# Patient Record
Sex: FEMALE | Race: BLACK OR AFRICAN AMERICAN | NOT HISPANIC OR LATINO | URBAN - METROPOLITAN AREA
[De-identification: names, ages, dates, MRNs, and addresses within clinical notes are randomized per-mention and may not be internally consistent; named-entity substitution may affect disease eponyms.]

---

## 2017-06-08 ENCOUNTER — OUTPATIENT (OUTPATIENT)
Dept: OUTPATIENT SERVICES | Facility: HOSPITAL | Age: 32
LOS: 1 days | End: 2017-06-08

## 2017-06-08 VITALS
TEMPERATURE: 98 F | WEIGHT: 216.05 LBS | RESPIRATION RATE: 16 BRPM | SYSTOLIC BLOOD PRESSURE: 132 MMHG | HEIGHT: 63 IN | HEART RATE: 74 BPM | DIASTOLIC BLOOD PRESSURE: 84 MMHG

## 2017-06-08 DIAGNOSIS — N84.0 POLYP OF CORPUS UTERI: ICD-10-CM

## 2017-06-08 DIAGNOSIS — Z98.890 OTHER SPECIFIED POSTPROCEDURAL STATES: Chronic | ICD-10-CM

## 2017-06-08 DIAGNOSIS — D64.9 ANEMIA, UNSPECIFIED: ICD-10-CM

## 2017-06-08 LAB
HCT VFR BLD CALC: 36.2 % — SIGNIFICANT CHANGE UP (ref 34.5–45)
HGB BLD-MCNC: 11.3 G/DL — LOW (ref 11.5–15.5)
MCHC RBC-ENTMCNC: 25.5 PG — LOW (ref 27–34)
MCHC RBC-ENTMCNC: 31.2 % — LOW (ref 32–36)
MCV RBC AUTO: 81.7 FL — SIGNIFICANT CHANGE UP (ref 80–100)
PLATELET # BLD AUTO: 316 K/UL — SIGNIFICANT CHANGE UP (ref 150–400)
PMV BLD: 11 FL — SIGNIFICANT CHANGE UP (ref 7–13)
RBC # BLD: 4.43 M/UL — SIGNIFICANT CHANGE UP (ref 3.8–5.2)
RBC # FLD: 14.7 % — HIGH (ref 10.3–14.5)
WBC # BLD: 6.33 K/UL — SIGNIFICANT CHANGE UP (ref 3.8–10.5)
WBC # FLD AUTO: 6.33 K/UL — SIGNIFICANT CHANGE UP (ref 3.8–10.5)

## 2017-06-08 NOTE — H&P PST ADULT - NECK DETAILS
supple/no JVD Palpable swelling to neck, pt report she had same evaluated in 2016 and NAD/no thyroid abnormality noted/supple/no JVD

## 2017-06-08 NOTE — H&P PST ADULT - HISTORY OF PRESENT ILLNESS
32 yo obese female with medical h/o Uterine fibroids, reports her desire to get pregnant and removal of fibroids was recommended. Pt presents today for presurgical evaluation for Hysteroscopy, Removal of Leiomyomata scheduled for 6/14/2017.

## 2017-06-08 NOTE — H&P PST ADULT - LYMPHATIC
anterior cervical R/posterior cervical L/supraclavicular L/supraclavicular R/posterior cervical R/anterior cervical L

## 2017-06-08 NOTE — H&P PST ADULT - MUSCULOSKELETAL
detailed exam negative no calf tenderness/ROM intact/no joint swelling/normal strength/no joint erythema/no joint warmth

## 2017-06-08 NOTE — H&P PST ADULT - NEGATIVE GENERAL GENITOURINARY SYMPTOMS
no renal colic/no incontinence/no flank pain L/no urinary hesitancy/no flank pain R/no urine discoloration/no hematuria/no dysuria/no nocturia/no bladder infections/normal urinary frequency

## 2017-06-08 NOTE — H&P PST ADULT - CONSTITUTIONAL DETAILS
no distress/obese/BMI 38.3/well-developed obese/well-nourished/well-developed/BMI 38.3/no distress/well-groomed

## 2017-06-08 NOTE — H&P PST ADULT - FAMILY HISTORY
Father  Still living? Unknown  Family history of hypertension, Age at diagnosis: Age Unknown  Family history of borderline diabetes mellitus, Age at diagnosis: Age Unknown     Mother  Still living? Unknown  Family history of hypertension, Age at diagnosis: Age Unknown

## 2017-06-08 NOTE — H&P PST ADULT - PROBLEM SELECTOR PLAN 1
Hysteroscopy, Removal of Leiomyomata scheduled for 6/14/2017.  Pre-op instructions given. Pt verbalized understanding.  Pepcid given for GI prophylaxis.  UCG ordered STAT for day of procedure - urine container given.

## 2017-06-14 ENCOUNTER — RESULT REVIEW (OUTPATIENT)
Age: 32
End: 2017-06-14

## 2017-06-14 ENCOUNTER — OUTPATIENT (OUTPATIENT)
Dept: OUTPATIENT SERVICES | Facility: HOSPITAL | Age: 32
LOS: 1 days | Discharge: HOME | End: 2017-06-14
Payer: COMMERCIAL

## 2017-06-14 VITALS
RESPIRATION RATE: 16 BRPM | OXYGEN SATURATION: 98 % | HEART RATE: 81 BPM | DIASTOLIC BLOOD PRESSURE: 84 MMHG | HEIGHT: 63 IN | SYSTOLIC BLOOD PRESSURE: 137 MMHG | WEIGHT: 216.05 LBS | TEMPERATURE: 98 F

## 2017-06-14 VITALS
OXYGEN SATURATION: 98 % | DIASTOLIC BLOOD PRESSURE: 83 MMHG | TEMPERATURE: 98 F | HEART RATE: 88 BPM | RESPIRATION RATE: 14 BRPM | SYSTOLIC BLOOD PRESSURE: 128 MMHG

## 2017-06-14 DIAGNOSIS — N84.0 POLYP OF CORPUS UTERI: ICD-10-CM

## 2017-06-14 DIAGNOSIS — Z98.890 OTHER SPECIFIED POSTPROCEDURAL STATES: Chronic | ICD-10-CM

## 2017-06-14 PROCEDURE — 88305 TISSUE EXAM BY PATHOLOGIST: CPT | Mod: 26

## 2017-06-14 RX ORDER — SODIUM CHLORIDE 9 MG/ML
3 INJECTION INTRAMUSCULAR; INTRAVENOUS; SUBCUTANEOUS ONCE
Qty: 0 | Refills: 0 | Status: DISCONTINUED | OUTPATIENT
Start: 2017-06-14 | End: 2017-06-15

## 2017-06-14 RX ORDER — SODIUM CHLORIDE 9 MG/ML
1000 INJECTION, SOLUTION INTRAVENOUS
Qty: 0 | Refills: 0 | Status: DISCONTINUED | OUTPATIENT
Start: 2017-06-14 | End: 2017-06-15

## 2017-06-14 NOTE — ASU DISCHARGE PLAN (ADULT/PEDIATRIC). - ACTIVITY LEVEL
no tub baths/no intercourse/no douching/no tampons/nothing per vagina no intercourse/nothing per vagina/no tampons/no tub baths/no douching/no sports/gym/no heavy lifting

## 2017-06-14 NOTE — ASU DISCHARGE PLAN (ADULT/PEDIATRIC). - MEDICATION SUMMARY - MEDICATIONS TO TAKE
I will START or STAY ON the medications listed below when I get home from the hospital:    Womens one a day prenatal +DHA  -- 1 cap(s) by mouth once a day  -- Indication: For supplement    ferrous sulfate 325 mg (65 mg elemental iron) oral delayed release tablet  -- 1 tab(s) by mouth once a day  -- Indication: For supplement

## 2017-06-14 NOTE — ASU DISCHARGE PLAN (ADULT/PEDIATRIC). - NURSING INSTRUCTIONS
You were given IV Tylenol for pain management.  Please DO NOT take tylenol or products that contain tylenol for the next 6-8 hours (until 10:00PM ). Please do not exceed 3000mg in 24hours.

## 2017-06-15 ENCOUNTER — TRANSCRIPTION ENCOUNTER (OUTPATIENT)
Age: 32
End: 2017-06-15

## 2019-02-10 ENCOUNTER — TRANSCRIPTION ENCOUNTER (OUTPATIENT)
Age: 34
End: 2019-02-10

## 2019-02-10 ENCOUNTER — EMERGENCY (EMERGENCY)
Facility: HOSPITAL | Age: 34
LOS: 1 days | Discharge: HOME | End: 2019-02-10
Attending: EMERGENCY MEDICINE | Admitting: EMERGENCY MEDICINE
Payer: COMMERCIAL

## 2019-02-10 VITALS
DIASTOLIC BLOOD PRESSURE: 71 MMHG | RESPIRATION RATE: 18 BRPM | SYSTOLIC BLOOD PRESSURE: 135 MMHG | OXYGEN SATURATION: 99 % | HEART RATE: 98 BPM | TEMPERATURE: 99 F

## 2019-02-10 DIAGNOSIS — Z98.890 OTHER SPECIFIED POSTPROCEDURAL STATES: Chronic | ICD-10-CM

## 2019-02-10 LAB
ANION GAP SERPL CALC-SCNC: 14 MMO/L — SIGNIFICANT CHANGE UP (ref 7–14)
APPEARANCE UR: SIGNIFICANT CHANGE UP
BACTERIA # UR AUTO: NEGATIVE — SIGNIFICANT CHANGE UP
BASOPHILS # BLD AUTO: 0.04 K/UL — SIGNIFICANT CHANGE UP (ref 0–0.2)
BASOPHILS NFR BLD AUTO: 0.5 % — SIGNIFICANT CHANGE UP (ref 0–2)
BILIRUB UR-MCNC: NEGATIVE — SIGNIFICANT CHANGE UP
BLD GP AB SCN SERPL QL: NEGATIVE — SIGNIFICANT CHANGE UP
BLOOD UR QL VISUAL: NEGATIVE — SIGNIFICANT CHANGE UP
BUN SERPL-MCNC: 5 MG/DL — LOW (ref 7–23)
CALCIUM SERPL-MCNC: 9.5 MG/DL — SIGNIFICANT CHANGE UP (ref 8.4–10.5)
CHLORIDE SERPL-SCNC: 102 MMOL/L — SIGNIFICANT CHANGE UP (ref 98–107)
CO2 SERPL-SCNC: 21 MMOL/L — LOW (ref 22–31)
COLOR SPEC: YELLOW — SIGNIFICANT CHANGE UP
CREAT SERPL-MCNC: 0.61 MG/DL — SIGNIFICANT CHANGE UP (ref 0.5–1.3)
EOSINOPHIL # BLD AUTO: 0.11 K/UL — SIGNIFICANT CHANGE UP (ref 0–0.5)
EOSINOPHIL NFR BLD AUTO: 1.4 % — SIGNIFICANT CHANGE UP (ref 0–6)
GLUCOSE SERPL-MCNC: 97 MG/DL — SIGNIFICANT CHANGE UP (ref 70–99)
GLUCOSE UR-MCNC: NEGATIVE — SIGNIFICANT CHANGE UP
HCG SERPL-ACNC: SIGNIFICANT CHANGE UP MIU/ML
HCT VFR BLD CALC: 37.8 % — SIGNIFICANT CHANGE UP (ref 34.5–45)
HGB BLD-MCNC: 12 G/DL — SIGNIFICANT CHANGE UP (ref 11.5–15.5)
HYALINE CASTS # UR AUTO: NEGATIVE — SIGNIFICANT CHANGE UP
IMM GRANULOCYTES NFR BLD AUTO: 0.7 % — SIGNIFICANT CHANGE UP (ref 0–1.5)
KETONES UR-MCNC: NEGATIVE — SIGNIFICANT CHANGE UP
LEUKOCYTE ESTERASE UR-ACNC: NEGATIVE — SIGNIFICANT CHANGE UP
LYMPHOCYTES # BLD AUTO: 0.73 K/UL — LOW (ref 1–3.3)
LYMPHOCYTES # BLD AUTO: 9 % — LOW (ref 13–44)
MCHC RBC-ENTMCNC: 25.9 PG — LOW (ref 27–34)
MCHC RBC-ENTMCNC: 31.7 % — LOW (ref 32–36)
MCV RBC AUTO: 81.5 FL — SIGNIFICANT CHANGE UP (ref 80–100)
MONOCYTES # BLD AUTO: 0.55 K/UL — SIGNIFICANT CHANGE UP (ref 0–0.9)
MONOCYTES NFR BLD AUTO: 6.8 % — SIGNIFICANT CHANGE UP (ref 2–14)
NEUTROPHILS # BLD AUTO: 6.61 K/UL — SIGNIFICANT CHANGE UP (ref 1.8–7.4)
NEUTROPHILS NFR BLD AUTO: 81.6 % — HIGH (ref 43–77)
NITRITE UR-MCNC: NEGATIVE — SIGNIFICANT CHANGE UP
NRBC # FLD: 0 K/UL — LOW (ref 25–125)
PH UR: 8 — SIGNIFICANT CHANGE UP (ref 5–8)
PLATELET # BLD AUTO: 281 K/UL — SIGNIFICANT CHANGE UP (ref 150–400)
PMV BLD: 10.1 FL — SIGNIFICANT CHANGE UP (ref 7–13)
POTASSIUM SERPL-MCNC: 4.3 MMOL/L — SIGNIFICANT CHANGE UP (ref 3.5–5.3)
POTASSIUM SERPL-SCNC: 4.3 MMOL/L — SIGNIFICANT CHANGE UP (ref 3.5–5.3)
PROT UR-MCNC: 30 — SIGNIFICANT CHANGE UP
RBC # BLD: 4.64 M/UL — SIGNIFICANT CHANGE UP (ref 3.8–5.2)
RBC # FLD: 14 % — SIGNIFICANT CHANGE UP (ref 10.3–14.5)
RBC CASTS # UR COMP ASSIST: SIGNIFICANT CHANGE UP (ref 0–?)
RH IG SCN BLD-IMP: POSITIVE — SIGNIFICANT CHANGE UP
SODIUM SERPL-SCNC: 137 MMOL/L — SIGNIFICANT CHANGE UP (ref 135–145)
SP GR SPEC: 1.02 — SIGNIFICANT CHANGE UP (ref 1–1.04)
SQUAMOUS # UR AUTO: SIGNIFICANT CHANGE UP
UROBILINOGEN FLD QL: NORMAL — SIGNIFICANT CHANGE UP
WBC # BLD: 8.1 K/UL — SIGNIFICANT CHANGE UP (ref 3.8–10.5)
WBC # FLD AUTO: 8.1 K/UL — SIGNIFICANT CHANGE UP (ref 3.8–10.5)
WBC UR QL: SIGNIFICANT CHANGE UP (ref 0–?)

## 2019-02-10 PROCEDURE — 76830 TRANSVAGINAL US NON-OB: CPT | Mod: 26

## 2019-02-10 PROCEDURE — 99284 EMERGENCY DEPT VISIT MOD MDM: CPT

## 2019-02-10 NOTE — ED ADULT NURSE NOTE - OBJECTIVE STATEMENT
pt received in int 4, A&Ox3, c/o vaginal "brown spotting" that started last night. pt currently 11 weeks pregnant, breaths even and unlabored, denies abd pain, cp, n/v/d, fever, dizziness, headache. 20G IV placedin R AC, labs sent, awaiting ultrasound, will continue to monitor.

## 2019-02-10 NOTE — ED PROVIDER NOTE - OBJECTIVE STATEMENT
34 YO F presents to ED s/p brown discharge which began yesterday. Pt is 11 weeks pregnant. LMP November 22nd. Denies any vaginal bleeding. States the brown discharge is more like brown spotting. Takes progesterone suppository (3 x's a day) because pt has had IVF and is to stop a week and a half. Denies burning when urinating. Notes coughing. A+ is blood type. Second time pregnant, but first pregnancy unsuccessful.

## 2019-02-10 NOTE — ED ADULT NURSE NOTE - NSIMPLEMENTINTERV_GEN_ALL_ED
Implemented All Universal Safety Interventions:  [place] to call system. Call bell, personal items and telephone within reach. Instruct patient to call for assistance. Room bathroom lighting operational. Non-slip footwear when patient is off stretcher. Physically safe environment: no spills, clutter or unnecessary equipment. Stretcher in lowest position, wheels locked, appropriate side rails in place.

## 2019-02-10 NOTE — ED PROVIDER NOTE - MEDICAL DECISION MAKING DETAILS
Pt with threatened ab, no bleeding now, will dc with copies of results to follow up with private gyn in 48 hours. Precautions reviewed.

## 2019-02-28 ENCOUNTER — ASOB RESULT (OUTPATIENT)
Age: 34
End: 2019-02-28

## 2019-02-28 ENCOUNTER — APPOINTMENT (OUTPATIENT)
Dept: ANTEPARTUM | Facility: CLINIC | Age: 34
End: 2019-02-28
Payer: COMMERCIAL

## 2019-02-28 PROCEDURE — 76813 OB US NUCHAL MEAS 1 GEST: CPT

## 2019-02-28 PROCEDURE — 76801 OB US < 14 WKS SINGLE FETUS: CPT

## 2019-02-28 PROCEDURE — 36416 COLLJ CAPILLARY BLOOD SPEC: CPT

## 2019-03-30 ENCOUNTER — TRANSCRIPTION ENCOUNTER (OUTPATIENT)
Age: 34
End: 2019-03-30

## 2019-03-30 ENCOUNTER — OUTPATIENT (OUTPATIENT)
Dept: INPATIENT UNIT | Facility: HOSPITAL | Age: 34
LOS: 1 days | End: 2019-03-30

## 2019-03-30 ENCOUNTER — EMERGENCY (EMERGENCY)
Facility: HOSPITAL | Age: 34
LOS: 1 days | Discharge: STILL ADMITTED OR ADMINISTRATIVE | End: 2019-03-30
Admitting: EMERGENCY MEDICINE

## 2019-03-30 VITALS
OXYGEN SATURATION: 100 % | SYSTOLIC BLOOD PRESSURE: 135 MMHG | RESPIRATION RATE: 20 BRPM | HEART RATE: 11 BPM | TEMPERATURE: 99 F | DIASTOLIC BLOOD PRESSURE: 72 MMHG

## 2019-03-30 VITALS
SYSTOLIC BLOOD PRESSURE: 131 MMHG | RESPIRATION RATE: 18 BRPM | HEART RATE: 109 BPM | DIASTOLIC BLOOD PRESSURE: 75 MMHG | TEMPERATURE: 98 F

## 2019-03-30 VITALS — TEMPERATURE: 98 F

## 2019-03-30 DIAGNOSIS — Z98.890 OTHER SPECIFIED POSTPROCEDURAL STATES: Chronic | ICD-10-CM

## 2019-03-30 DIAGNOSIS — O26.899 OTHER SPECIFIED PREGNANCY RELATED CONDITIONS, UNSPECIFIED TRIMESTER: ICD-10-CM

## 2019-03-30 DIAGNOSIS — Z3A.00 WEEKS OF GESTATION OF PREGNANCY NOT SPECIFIED: ICD-10-CM

## 2019-03-30 LAB
AMNISURE ROM (RUPTURE OF MEMBRANES): POSITIVE — SIGNIFICANT CHANGE UP
AMNISURE ROM (RUPTURE OF MEMBRANES): POSITIVE — SIGNIFICANT CHANGE UP
BASOPHILS # BLD AUTO: 0.02 K/UL — SIGNIFICANT CHANGE UP (ref 0–0.2)
BASOPHILS NFR BLD AUTO: 0.2 % — SIGNIFICANT CHANGE UP (ref 0–2)
BLD GP AB SCN SERPL QL: NEGATIVE — SIGNIFICANT CHANGE UP
EOSINOPHIL # BLD AUTO: 0.01 K/UL — SIGNIFICANT CHANGE UP (ref 0–0.5)
EOSINOPHIL NFR BLD AUTO: 0.1 % — SIGNIFICANT CHANGE UP (ref 0–6)
HCT VFR BLD CALC: 35.7 % — SIGNIFICANT CHANGE UP (ref 34.5–45)
HGB BLD-MCNC: 11.4 G/DL — LOW (ref 11.5–15.5)
IMM GRANULOCYTES NFR BLD AUTO: 0.8 % — SIGNIFICANT CHANGE UP (ref 0–1.5)
LYMPHOCYTES # BLD AUTO: 0.77 K/UL — LOW (ref 1–3.3)
LYMPHOCYTES # BLD AUTO: 6.9 % — LOW (ref 13–44)
MCHC RBC-ENTMCNC: 25.6 PG — LOW (ref 27–34)
MCHC RBC-ENTMCNC: 31.9 % — LOW (ref 32–36)
MCV RBC AUTO: 80 FL — SIGNIFICANT CHANGE UP (ref 80–100)
MONOCYTES # BLD AUTO: 0.51 K/UL — SIGNIFICANT CHANGE UP (ref 0–0.9)
MONOCYTES NFR BLD AUTO: 4.6 % — SIGNIFICANT CHANGE UP (ref 2–14)
NEUTROPHILS # BLD AUTO: 9.8 K/UL — HIGH (ref 1.8–7.4)
NEUTROPHILS NFR BLD AUTO: 87.4 % — HIGH (ref 43–77)
NRBC # FLD: 0 K/UL — SIGNIFICANT CHANGE UP (ref 0–0)
PLATELET # BLD AUTO: 275 K/UL — SIGNIFICANT CHANGE UP (ref 150–400)
PMV BLD: 10.7 FL — SIGNIFICANT CHANGE UP (ref 7–13)
RBC # BLD: 4.46 M/UL — SIGNIFICANT CHANGE UP (ref 3.8–5.2)
RBC # FLD: 15.6 % — HIGH (ref 10.3–14.5)
RH IG SCN BLD-IMP: POSITIVE — SIGNIFICANT CHANGE UP
WBC # BLD: 11.2 K/UL — HIGH (ref 3.8–10.5)
WBC # FLD AUTO: 11.2 K/UL — HIGH (ref 3.8–10.5)

## 2019-03-30 RX ORDER — CITRIC ACID/SODIUM CITRATE 300-500 MG
15 SOLUTION, ORAL ORAL EVERY 4 HOURS
Qty: 0 | Refills: 0 | Status: DISCONTINUED | OUTPATIENT
Start: 2019-03-30 | End: 2019-03-30

## 2019-03-30 RX ORDER — ACETAMINOPHEN 500 MG
975 TABLET ORAL ONCE
Qty: 0 | Refills: 0 | Status: COMPLETED | OUTPATIENT
Start: 2019-03-30 | End: 2019-03-30

## 2019-03-30 RX ORDER — OXYTOCIN 10 UNIT/ML
333.33 VIAL (ML) INJECTION
Qty: 20 | Refills: 0 | Status: DISCONTINUED | OUTPATIENT
Start: 2019-03-30 | End: 2019-03-30

## 2019-03-30 RX ORDER — LABETALOL HCL 100 MG
100 TABLET ORAL EVERY 12 HOURS
Qty: 0 | Refills: 0 | Status: DISCONTINUED | OUTPATIENT
Start: 2019-03-30 | End: 2019-03-30

## 2019-03-30 RX ORDER — INFLUENZA VIRUS VACCINE 15; 15; 15; 15 UG/.5ML; UG/.5ML; UG/.5ML; UG/.5ML
0.5 SUSPENSION INTRAMUSCULAR ONCE
Qty: 0 | Refills: 0 | Status: DISCONTINUED | OUTPATIENT
Start: 2019-03-30 | End: 2019-03-30

## 2019-03-30 RX ORDER — ACETAMINOPHEN 500 MG
650 TABLET ORAL EVERY 6 HOURS
Qty: 0 | Refills: 0 | Status: DISCONTINUED | OUTPATIENT
Start: 2019-03-30 | End: 2019-03-30

## 2019-03-30 RX ADMIN — Medication 975 MILLIGRAM(S): at 18:00

## 2019-03-30 RX ADMIN — Medication 100 MILLIGRAM(S): at 14:13

## 2019-03-30 RX ADMIN — Medication 975 MILLIGRAM(S): at 17:35

## 2019-03-30 NOTE — OB PROVIDER TRIAGE NOTE - PMH
Anemia    Chronic hypertension    Intramural leiomyoma of uterus    Menorrhagia    Obesity (BMI 30-39.9)

## 2019-03-30 NOTE — DISCHARGE NOTE ANTEPARTUM - MEDICATION SUMMARY - MEDICATIONS TO TAKE
I will START or STAY ON the medications listed below when I get home from the hospital:    Womens one a day prenatal +DHA  -- 1 cap(s) by mouth once a day  -- Indication: For Home med    labetalol  -- 100 milligram(s) by mouth 2 times a day  -- Indication: For BP    ferrous sulfate 325 mg (65 mg elemental iron) oral delayed release tablet  -- 1 tab(s) by mouth once a day  -- Indication: For Home med

## 2019-03-30 NOTE — CHART NOTE - NSCHARTNOTEFT_GEN_A_CORE
OB attending note:  Pt afebrile; VSS stable  Abd: gravid; non-tender  Pelvic: deferred (1.5 cm dilated in triage, ruptured clear)  Exts: no CCE    Labs:  H&H=11.4/35.7; WBC=11.2                 + amnisure  A:IUP at 18 1/7 weeks with PPROM  P: Discussion with pt, , pt's parents and . We explained that in the presence of PPROM in an immature fetus the prognosis is extremely poor for survival. We also discussed risk of infection and sepsis possibly needing a hysterectomy. Patient feels she cannot make a decision today to terminate this pregnancy and is willing to go home and think about her options. I told patient to monitor herself very closely with taking her temperature twice daily. If patient develops fever, vaginal bleeding, foul-smelling discharge, contractions, she needs to call my office and come back to the hospital immediately.

## 2019-03-30 NOTE — DISCHARGE NOTE ANTEPARTUM - HOSPITAL COURSE
32 y/o @18.1wks presented with advanced cervical exam, PPROM. +FH. Pt was counseled extensively regarding possible complications going forwards including, but not limited to infection/sepsis, fetal deformity, fetal demise, need for possible invasive surgical procedures including hysterectomy. Pt verbalized understanding and made decision for conservative management at this time. Pt discharged with strict precautions and follow up outpt with her obgyn.

## 2019-03-30 NOTE — OB RN TRIAGE NOTE - PMH
Anemia    Anemia    Chronic hypertension    Intramural leiomyoma of uterus    Menorrhagia    Obesity (BMI 30-39.9)

## 2019-03-30 NOTE — DISCHARGE NOTE ANTEPARTUM - PLAN OF CARE
continue care, monitor for S&S of infection, labor Monitor for signs and symptoms of infection and/or labor. Please follow up with obgyn for care.

## 2019-03-30 NOTE — DISCHARGE NOTE ANTEPARTUM - CARE PLAN
Principal Discharge DX:	 premature rupture of membranes  Goal:	continue care, monitor for S&S of infection, labor  Assessment and plan of treatment:	Monitor for signs and symptoms of infection and/or labor. Please follow up with obgyn for care.

## 2019-03-30 NOTE — OB PROVIDER IHI INDUCTION/AUGMENTATION NOTE - NS_CHECKALL_OBGYN_ALL_OB
Order was written/H&P was completed/Induction / Augmentation was discussed/FHR was reviewed/Contractions pattern was reviewed

## 2019-03-30 NOTE — DISCHARGE NOTE ANTEPARTUM - PATIENT PORTAL LINK FT
You can access the [facility], offered by [facility], by registering with the following website: [email or web address]

## 2019-03-30 NOTE — ED ADULT TRIAGE NOTE - CHIEF COMPLAINT QUOTE
18 wks pregnant supposed to have her 18 week US this Thursday, comes in c/o vaginal DC liquidy mucousy, denies bleeding.  Spoke with L&D Triage pt to L&D

## 2019-03-30 NOTE — DISCHARGE NOTE ANTEPARTUM - CARE PROVIDER_API CALL
Erika Huffman)  Obstetrics and Gynecology  [address] [place] [address]  Phone: [phone]  Fax: [phone]  Follow Up Time:

## 2019-03-30 NOTE — OB PROVIDER TRIAGE NOTE - NSOBPROVIDERNOTE_OBGYN_ALL_OB_FT
Pt of Dr. Ward. 34 yo  @ 18 1/7 weeks with complaints of a gush of fluid at 830 am. Pt reports vaginal bleeding upon presentation to triage. IVF pregnancy.    current a/p complications: CHTN on labetalol 100 bid    Allergies: NKDA  Medications: pnv, iron, vit D, labetalol 100 bid  PMHx: CHTN, anemia  PSHx: Abdominal myomectomy   OB/GYN: hx of fibroids  Social: Denies   Psychosocial: Denies     Assessment/plan:  Vital Signs Last 24 Hrs  T(C): 36.7 (30 Mar 2019 09:40), Max: 37.2 (30 Mar 2019 09:13)  T(F): 98.1 (30 Mar 2019 09:40), Max: 98.9 (30 Mar 2019 09:13)  HR: 93 (30 Mar 2019 10:57) (11 - 109)  BP: 133/66 (30 Mar 2019 10:57) (122/66 - 137/86)  BP(mean): --  RR: 18 (30 Mar 2019 09:40) (18 - 20)  SpO2: 100% (30 Mar 2019 09:13) (100% - 100%)    SSE cervix appears open, no active bleeding noted, membranes at the os    SVE 1-2/80/-3    TAUS     Amnisure pending Pt of Dr. Ward. 34 yo  @ 18 1/7 weeks with complaints of a gush of fluid at 830 am. Pt reports vaginal bleeding upon presentation to triage. IVF pregnancy.    current a/p complications: CHTN on labetalol 100 bid    Allergies: NKDA  Medications: pnv, iron, vit D, labetalol 100 bid  PMHx: CHTN, anemia  PSHx: Abdominal myomectomy   OB/GYN: hx of fibroids  Social: Denies   Psychosocial: Denies     Assessment/plan:  Vital Signs Last 24 Hrs  T(C): 36.7 (30 Mar 2019 09:40), Max: 37.2 (30 Mar 2019 09:13)  T(F): 98.1 (30 Mar 2019 09:40), Max: 98.9 (30 Mar 2019 09:13)  HR: 93 (30 Mar 2019 10:57) (11 - 109)  BP: 133/66 (30 Mar 2019 10:57) (122/66 - 137/86)  BP(mean): --  RR: 18 (30 Mar 2019 09:40) (18 - 20)  SpO2: 100% (30 Mar 2019 09:13) (100% - 100%)    SSE cervix appears open, no active bleeding noted, membranes at the os    SVE 1-2/80/-3    TAUS     1058 am Amnisure sent, lost by lab    2nd amnisure sent at 1158 am, pending results Pt of Dr. Ward. 32 yo  @ 18 1/7 weeks with complaints of a gush of fluid at 830 am. Pt reports vaginal bleeding upon presentation to triage. IVF pregnancy.    current a/p complications: CHTN on labetalol 100 bid    Allergies: NKDA  Medications: pnv, iron, vit D, labetalol 100 bid  PMHx: CHTN, anemia  PSHx: Abdominal myomectomy   OB/GYN: hx of fibroids  Social: Denies   Psychosocial: Denies     Assessment/plan:  Vital Signs Last 24 Hrs  T(C): 36.7 (30 Mar 2019 09:40), Max: 37.2 (30 Mar 2019 09:13)  T(F): 98.1 (30 Mar 2019 09:40), Max: 98.9 (30 Mar 2019 09:13)  HR: 93 (30 Mar 2019 10:57) (11 - 109)  BP: 133/66 (30 Mar 2019 10:57) (122/66 - 137/86)  BP(mean): --  RR: 18 (30 Mar 2019 09:40) (18 - 20)  SpO2: 100% (30 Mar 2019 09:13) (100% - 100%)    SSE cervix appears open, no active bleeding noted, membranes at the os    SVE 1-2/80/-3    TAUS     1058 am Amnisure sent, lost by lab    2nd amnisure sent at 1158 am, results negative     Dr. Altman and Dr. Mahmood at the bedside, pt counseled    Admit to L&D for IOL. Routine orders placed.

## 2019-03-30 NOTE — OB PROVIDER H&P - ASSESSMENT
Pt of Dr. Ward. 32 yo  @ 18 1/ weeks with complaints of a gush of fluid at 830 am. Pt reports vaginal bleeding upon presentation to triage. IVF pregnancy.    current a/p complications: CHTN on labetalol 100 bid    Allergies: NKDA  Medications: pnv, iron, vit D, labetalol 100 bid last dose 3/29/19 930 am  PMHx: CHTN, anemia  PSHx: Abdominal myomectomy   OB/GYN: hx of fibroids  Social: Denies   Psychosocial: Denies     Assessment/plan:  Vital Signs Last 24 Hrs  T(C): 36.7 (30 Mar 2019 09:40), Max: 37.2 (30 Mar 2019 09:13)  T(F): 98.1 (30 Mar 2019 09:40), Max: 98.9 (30 Mar 2019 09:13)  HR: 93 (30 Mar 2019 10:57) (11 - 109)  BP: 133/66 (30 Mar 2019 10:57) (122/66 - 137/86)  BP(mean): --  RR: 18 (30 Mar 2019 09:40) (18 - 20)  SpO2: 100% (30 Mar 2019 09:13) (100% - 100%)    SSE cervix appears open, no active bleeding noted, membranes at the os    SVE 1-2//-3    TAUS     1058 am Amnisure sent, lost by lab    2nd amnisure sent at 1158 am, results negative     Dr. Altman and Dr. Mahmood at the bedside, pt counseled    Admit to L&D for IOL. Routine orders placed.

## 2019-03-31 ENCOUNTER — RESULT REVIEW (OUTPATIENT)
Age: 34
End: 2019-03-31

## 2019-03-31 ENCOUNTER — INPATIENT (INPATIENT)
Facility: HOSPITAL | Age: 34
LOS: 0 days | Discharge: HOME | End: 2019-04-01
Attending: OBSTETRICS & GYNECOLOGY | Admitting: OBSTETRICS & GYNECOLOGY
Payer: COMMERCIAL

## 2019-03-31 VITALS — TEMPERATURE: 99 F

## 2019-03-31 DIAGNOSIS — Z98.890 OTHER SPECIFIED POSTPROCEDURAL STATES: Chronic | ICD-10-CM

## 2019-03-31 DIAGNOSIS — O42.912 PRETERM PREMATURE RUPTURE OF MEMBRANES, UNSPECIFIED AS TO LENGTH OF TIME BETWEEN RUPTURE AND ONSET OF LABOR, SECOND TRIMESTER: ICD-10-CM

## 2019-03-31 DIAGNOSIS — O42.10 PREMATURE RUPTURE OF MEMBRANES, ONSET OF LABOR MORE THAN 24 HOURS FOLLOWING RUPTURE, UNSPECIFIED WEEKS OF GESTATION: ICD-10-CM

## 2019-03-31 LAB
BASOPHILS # BLD AUTO: 0.02 K/UL — SIGNIFICANT CHANGE UP (ref 0–0.2)
BASOPHILS # BLD AUTO: 0.03 K/UL — SIGNIFICANT CHANGE UP (ref 0–0.2)
BASOPHILS NFR BLD AUTO: 0.2 % — SIGNIFICANT CHANGE UP (ref 0–2)
BASOPHILS NFR BLD AUTO: 0.3 % — SIGNIFICANT CHANGE UP (ref 0–2)
BLD GP AB SCN SERPL QL: NEGATIVE — SIGNIFICANT CHANGE UP
EOSINOPHIL # BLD AUTO: 0.05 K/UL — SIGNIFICANT CHANGE UP (ref 0–0.5)
EOSINOPHIL # BLD AUTO: 0.05 K/UL — SIGNIFICANT CHANGE UP (ref 0–0.5)
EOSINOPHIL NFR BLD AUTO: 0.4 % — SIGNIFICANT CHANGE UP (ref 0–6)
EOSINOPHIL NFR BLD AUTO: 0.5 % — SIGNIFICANT CHANGE UP (ref 0–6)
HCT VFR BLD CALC: 35.6 % — SIGNIFICANT CHANGE UP (ref 34.5–45)
HCT VFR BLD CALC: 35.7 % — SIGNIFICANT CHANGE UP (ref 34.5–45)
HGB BLD-MCNC: 10.8 G/DL — LOW (ref 11.5–15.5)
HGB BLD-MCNC: 10.8 G/DL — LOW (ref 11.5–15.5)
IMM GRANULOCYTES NFR BLD AUTO: 0.7 % — SIGNIFICANT CHANGE UP (ref 0–1.5)
IMM GRANULOCYTES NFR BLD AUTO: 0.7 % — SIGNIFICANT CHANGE UP (ref 0–1.5)
LYMPHOCYTES # BLD AUTO: 0.91 K/UL — LOW (ref 1–3.3)
LYMPHOCYTES # BLD AUTO: 0.97 K/UL — LOW (ref 1–3.3)
LYMPHOCYTES # BLD AUTO: 8 % — LOW (ref 13–44)
LYMPHOCYTES # BLD AUTO: 9.1 % — LOW (ref 13–44)
MCHC RBC-ENTMCNC: 24.9 PG — LOW (ref 27–34)
MCHC RBC-ENTMCNC: 24.9 PG — LOW (ref 27–34)
MCHC RBC-ENTMCNC: 30.3 % — LOW (ref 32–36)
MCHC RBC-ENTMCNC: 30.3 % — LOW (ref 32–36)
MCV RBC AUTO: 82.2 FL — SIGNIFICANT CHANGE UP (ref 80–100)
MCV RBC AUTO: 82.4 FL — SIGNIFICANT CHANGE UP (ref 80–100)
MONOCYTES # BLD AUTO: 0.65 K/UL — SIGNIFICANT CHANGE UP (ref 0–0.9)
MONOCYTES # BLD AUTO: 0.69 K/UL — SIGNIFICANT CHANGE UP (ref 0–0.9)
MONOCYTES NFR BLD AUTO: 5.7 % — SIGNIFICANT CHANGE UP (ref 2–14)
MONOCYTES NFR BLD AUTO: 6.5 % — SIGNIFICANT CHANGE UP (ref 2–14)
NEUTROPHILS # BLD AUTO: 10.35 K/UL — HIGH (ref 1.8–7.4)
NEUTROPHILS # BLD AUTO: 8.3 K/UL — HIGH (ref 1.8–7.4)
NEUTROPHILS NFR BLD AUTO: 82.9 % — HIGH (ref 43–77)
NEUTROPHILS NFR BLD AUTO: 85 % — HIGH (ref 43–77)
NRBC # FLD: 0 K/UL — SIGNIFICANT CHANGE UP (ref 0–0)
NRBC # FLD: 0 K/UL — SIGNIFICANT CHANGE UP (ref 0–0)
PLATELET # BLD AUTO: 270 K/UL — SIGNIFICANT CHANGE UP (ref 150–400)
PLATELET # BLD AUTO: 270 K/UL — SIGNIFICANT CHANGE UP (ref 150–400)
PMV BLD: 10.8 FL — SIGNIFICANT CHANGE UP (ref 7–13)
PMV BLD: 10.9 FL — SIGNIFICANT CHANGE UP (ref 7–13)
RBC # BLD: 4.33 M/UL — SIGNIFICANT CHANGE UP (ref 3.8–5.2)
RBC # BLD: 4.33 M/UL — SIGNIFICANT CHANGE UP (ref 3.8–5.2)
RBC # FLD: 15.4 % — HIGH (ref 10.3–14.5)
RBC # FLD: 15.5 % — HIGH (ref 10.3–14.5)
RH IG SCN BLD-IMP: POSITIVE — SIGNIFICANT CHANGE UP
WBC # BLD: 10.01 K/UL — SIGNIFICANT CHANGE UP (ref 3.8–10.5)
WBC # BLD: 12.17 K/UL — HIGH (ref 3.8–10.5)
WBC # FLD AUTO: 10.01 K/UL — SIGNIFICANT CHANGE UP (ref 3.8–10.5)
WBC # FLD AUTO: 12.17 K/UL — HIGH (ref 3.8–10.5)

## 2019-03-31 PROCEDURE — 88300 SURGICAL PATH GROSS: CPT | Mod: 26,59

## 2019-03-31 PROCEDURE — 88307 TISSUE EXAM BY PATHOLOGIST: CPT | Mod: 26

## 2019-03-31 PROCEDURE — 99232 SBSQ HOSP IP/OBS MODERATE 35: CPT

## 2019-03-31 RX ORDER — FENTANYL CITRATE 50 UG/ML
25 INJECTION INTRAVENOUS
Qty: 0 | Refills: 0 | Status: DISCONTINUED | OUTPATIENT
Start: 2019-03-31 | End: 2019-04-01

## 2019-03-31 RX ORDER — DIPHENOXYLATE HCL/ATROPINE 2.5-.025MG
2 TABLET ORAL ONCE
Qty: 0 | Refills: 0 | Status: DISCONTINUED | OUTPATIENT
Start: 2019-03-31 | End: 2019-03-31

## 2019-03-31 RX ORDER — CEFAZOLIN SODIUM 1 G
2000 VIAL (EA) INJECTION ONCE
Qty: 0 | Refills: 0 | Status: COMPLETED | OUTPATIENT
Start: 2019-03-31 | End: 2019-03-31

## 2019-03-31 RX ORDER — CARBOPROST TROMETHAMINE 250 UG/ML
250 INJECTION, SOLUTION INTRAMUSCULAR
Qty: 0 | Refills: 0 | Status: COMPLETED | OUTPATIENT
Start: 2019-03-31 | End: 2019-03-31

## 2019-03-31 RX ORDER — CITRIC ACID/SODIUM CITRATE 300-500 MG
15 SOLUTION, ORAL ORAL EVERY 4 HOURS
Qty: 0 | Refills: 0 | Status: DISCONTINUED | OUTPATIENT
Start: 2019-03-31 | End: 2019-04-01

## 2019-03-31 RX ORDER — LABETALOL HCL 100 MG
100 TABLET ORAL EVERY 12 HOURS
Qty: 0 | Refills: 0 | Status: DISCONTINUED | OUTPATIENT
Start: 2019-03-31 | End: 2019-04-01

## 2019-03-31 RX ORDER — INFLUENZA VIRUS VACCINE 15; 15; 15; 15 UG/.5ML; UG/.5ML; UG/.5ML; UG/.5ML
0.5 SUSPENSION INTRAMUSCULAR ONCE
Qty: 0 | Refills: 0 | Status: DISCONTINUED | OUTPATIENT
Start: 2019-03-31 | End: 2019-04-01

## 2019-03-31 RX ORDER — SODIUM CHLORIDE 9 MG/ML
1000 INJECTION, SOLUTION INTRAVENOUS ONCE
Qty: 0 | Refills: 0 | Status: DISCONTINUED | OUTPATIENT
Start: 2019-03-31 | End: 2019-04-01

## 2019-03-31 RX ORDER — ONDANSETRON 8 MG/1
4 TABLET, FILM COATED ORAL ONCE
Qty: 0 | Refills: 0 | Status: DISCONTINUED | OUTPATIENT
Start: 2019-03-31 | End: 2019-04-01

## 2019-03-31 RX ORDER — SODIUM CHLORIDE 9 MG/ML
1000 INJECTION, SOLUTION INTRAVENOUS
Qty: 0 | Refills: 0 | Status: DISCONTINUED | OUTPATIENT
Start: 2019-03-31 | End: 2019-04-01

## 2019-03-31 RX ORDER — ONDANSETRON 8 MG/1
4 TABLET, FILM COATED ORAL ONCE
Qty: 0 | Refills: 0 | Status: COMPLETED | OUTPATIENT
Start: 2019-03-31 | End: 2019-03-31

## 2019-03-31 RX ORDER — CEFAZOLIN SODIUM 1 G
2000 VIAL (EA) INJECTION ONCE
Qty: 0 | Refills: 0 | Status: COMPLETED | OUTPATIENT
Start: 2019-04-01 | End: 2019-04-01

## 2019-03-31 RX ORDER — MORPHINE SULFATE 50 MG/1
4 CAPSULE, EXTENDED RELEASE ORAL ONCE
Qty: 0 | Refills: 0 | Status: DISCONTINUED | OUTPATIENT
Start: 2019-03-31 | End: 2019-03-31

## 2019-03-31 RX ORDER — HYDROMORPHONE HYDROCHLORIDE 2 MG/ML
1 INJECTION INTRAMUSCULAR; INTRAVENOUS; SUBCUTANEOUS
Qty: 0 | Refills: 0 | Status: DISCONTINUED | OUTPATIENT
Start: 2019-03-31 | End: 2019-04-01

## 2019-03-31 RX ADMIN — Medication 100 MILLIGRAM(S): at 21:56

## 2019-03-31 RX ADMIN — MORPHINE SULFATE 4 MILLIGRAM(S): 50 CAPSULE, EXTENDED RELEASE ORAL at 18:50

## 2019-03-31 RX ADMIN — MORPHINE SULFATE 4 MILLIGRAM(S): 50 CAPSULE, EXTENDED RELEASE ORAL at 19:30

## 2019-03-31 RX ADMIN — Medication 100 MILLIGRAM(S): at 19:39

## 2019-03-31 RX ADMIN — ONDANSETRON 4 MILLIGRAM(S): 8 TABLET, FILM COATED ORAL at 20:42

## 2019-03-31 RX ADMIN — MORPHINE SULFATE 4 MILLIGRAM(S): 50 CAPSULE, EXTENDED RELEASE ORAL at 18:47

## 2019-03-31 RX ADMIN — Medication 2 TABLET(S): at 20:42

## 2019-03-31 RX ADMIN — CARBOPROST TROMETHAMINE 250 MICROGRAM(S): 250 INJECTION, SOLUTION INTRAMUSCULAR at 20:42

## 2019-03-31 RX ADMIN — CARBOPROST TROMETHAMINE 250 MICROGRAM(S): 250 INJECTION, SOLUTION INTRAMUSCULAR at 21:14

## 2019-03-31 RX ADMIN — CARBOPROST TROMETHAMINE 250 MICROGRAM(S): 250 INJECTION, SOLUTION INTRAMUSCULAR at 20:59

## 2019-03-31 NOTE — BRIEF OPERATIVE NOTE - NSICDXBRIEFPROCEDURE_GEN_ALL_CORE_FT
PROCEDURES:  Dilation and curettage of uterus using suction with ultrasound guidance 31-Mar-2019 23:26:44  Angela Silva

## 2019-03-31 NOTE — CHART NOTE - NSCHARTNOTEFT_GEN_A_CORE
S:  Patient seen for placement of vaginal cytotec       O:   T(C): 37 (14:39)  HR: 92 (16:50)  BP: 146/69 (16:36)  RR: 18 (14:39)  SpO2: 100% (16:50)  SVE: 1/80/-3    A/P: 33y at 18w2d admitted for iol of labor for previable PPROM  - 400mcg of cytotec placed vaginally  - 400mcg of cytotec buccally  - toco monitoring   - continue V cyto 400mcg q3h  - pain meds PRN    T Tom PGY-3

## 2019-03-31 NOTE — OB PROVIDER H&P - FAMILY HISTORY
Father  Still living? Unknown  Family history of borderline diabetes mellitus, Age at diagnosis: Age Unknown     Mother  Still living? Unknown  Family history of hypertension, Age at diagnosis: Age Unknown

## 2019-03-31 NOTE — OB PROVIDER H&P - ASSESSMENT
- NPO  - IVF  - cytotec 400 vaginal/400 buccal  - vaginal cytotec 400 q3h  - pain medication PRN  - declines autopsy or genetics  - still deciding on burial  - D&C consent signed    Patient seen with Dr. Murphy and Dr. Ela Santos PGY-3

## 2019-03-31 NOTE — PERINATAL/NEONATAL BEREAVEMENT NOTE - AGE OF SIBLINGS
"        Nubia Stapleton   2017 10:10 AM   Office Visit   MRN: [id number]    [facility]   Dept Phone:  [phone]    Description:  Female : 1969   Provider:  Sabas Coughlin M.D.           Reason for Visit     Follow-Up           Allergies as of 2017     Allergen Noted Reactions    Dilantin [Alc-Fd&C Yellow #6-Na Benzoate-Phenytoin] 2017   Hives    Dilaudid [Hydromorphone Hcl] 2010       Flagyl [Metronidazole] 2015       Pcn [Penicillins] 06/15/2009       Zoloft 2016       Heart palpitations      You were diagnosed with     Dizziness and giddiness   [780.4.ICD-9-CM]       Eyelid pain of both eyes   [9542207]       Thyroid mass   [837844]       Anxiety   [012872]       History of asthma   [397801]         Vital Signs     Blood Pressure Pulse Temperature Respirations Height Weight    110/82 mmHg 84 36.8 °C (98.2 °F) 16 1.575 m (5' 2\") 63.957 kg (141 lb)    Body Mass Index Oxygen Saturation Last Menstrual Period Breastfeeding? Smoking Status       25.78 kg/m2 98% 2015 No Former Smoker       Basic Information     Date Of Birth Sex Race Ethnicity Preferred Language    1969 Female White Non- English      Your appointments     2017 11:00 AM   Follow Up Visit with Karthikeyan Rose M.D.   [facility] (--)    [address] [place] [address], [address]  [facility] [address] [phone]           You will be receiving a confirmation call a few days before your appointment from our automated call confirmation system.              Problem List              ICD-10-CM Priority Class Noted - Resolved    Left shoulder pain M25.512   2015 - Present    Vaginal discharge N89.8   2015 - Present    Absolute anemia (Chronic) D64.9   2015 - Present    Other injury of muscle(s) and tendon(s) of the rotator cuff of left shoulder, initial encounter S46.092A   2015 - Present    Shoulder pain, left M25.512   2015 - Present   " Chronic low back pain M54.5, G89.29   6/11/2015 - Present    Anxiety disorder (Chronic) F41.9   10/1/2015 - Present    Depression (Chronic) F32.9   10/1/2015 - Present    Leukopenia (Chronic) D72.819   10/29/2015 - Present    Cystocele, midline N81.11   1/19/2016 - Present    Coitalgia [id number]   1/19/2016 - Present    Vaginal pain R10.2   1/19/2016 - Present    Abnormal menses N92.6   1/19/2016 - Present    Female genuine stress incontinence N39.3   1/19/2016 - Present    Status post hysterectomy Z90.710   4/19/2016 - Present    Chest pain R07.9 High  11/26/2016 - Present    Vertigo (Chronic) R42   11/27/2016 - Present    Dizziness and giddiness R42   12/8/2016 - Present    Hyperlipidemia E78.5   12/8/2016 - Present      Health Maintenance        Date Due Completion Dates    IMM DTaP/Tdap/Td Vaccine (1 - Tdap) 3/26/1988 ---    MAMMOGRAM 1/18/2018 1/18/2017, 12/30/2016, 6/1/2015    PAP SMEAR 6/19/2018 6/19/2015            Current Immunizations     No immunizations on file.      Below and/or attached are the medications your provider expects you to take. Review all of your home medications and newly ordered medications with your provider and/or pharmacist. Follow medication instructions as directed by your provider and/or pharmacist. Please keep your medication list with you and share with your provider. Update the information when medications are discontinued, doses are changed, or new medications (including over-the-counter products) are added; and carry medication information at all times in the event of emergency situations     Allergies:  DILANTIN - Hives     DILAUDID - (reactions not documented)     FLAGYL - (reactions not documented)     PCN - (reactions not documented)     ZOLOFT - (reactions not documented)               Medications  Valid as of: May 18, 2017 - 10:20 AM    Generic Name Brand Name Tablet Size Instructions for use    Albuterol Sulfate (Aero Soln) albuterol 108 (90 BASE) MCG/ACT Inhale 2 Puffs  by mouth every 6 hours as needed for Shortness of Breath.        .                 Medicines prescribed today were sent to:     [facility] #[id number] - [place], [place] - [address]    [address] [place] [address]    Phone: [phone] Fax: [phone]    Open 24 Hours?: No      Medication refill instructions:       If your prescription bottle indicates you have medication refills left, it is not necessary to call your provider’s office. Please contact your pharmacy and they will refill your medication.    If your prescription bottle indicates you do not have any refills left, you may request refills at any time through one of the following ways: The online [employer] system (except Urgent Care), by calling your provider’s office, or by asking your pharmacy to contact your provider’s office with a refill request. Medication refills are processed only during regular business hours and may not be available until the next business day. Your provider may request additional information or to have a follow-up visit with you prior to refilling your medication.   *Please Note: Medication refills are assigned a new Rx number when refilled electronically. Your pharmacy may indicate that no refills were authorized even though a new prescription for the same medication is available at the pharmacy. Please request the medicine by name with the pharmacy before contacting your provider for a refill.        Your To Do List     Future Labs/Procedures Complete By Expires    FREE THYROXINE  As directed 5/18/2018    TRIIDOTHYRONINE  As directed 5/18/2018    TSH  As directed 5/18/2018    US-SOFT TISSUES OF HEAD - NECK  As directed 5/18/2018      Other Notes About Your Plan     Fall risk done 5/28/15           [employer] Access Code: Activation code not generated  Current [employer] Status: Active           n/a

## 2019-03-31 NOTE — CHART NOTE - NSCHARTNOTEFT_GEN_A_CORE
R3 Ob note    Pt examined at bedside to assess cervical change. Pt notes irregular ctx, less intense than before    SVE: 1/long/-3  EFM: baseline indeterminate due to fetal arrhythmia  TOco: irreg    Plan: will d/c continuous monitoring for now. For fetal echo in AM and evaluation by pediatric cardiology. Will reexamine as needed.    Shira, PGY-3  d/w Dr. Kolby Tran

## 2019-03-31 NOTE — OB PROVIDER H&P - HISTORY OF PRESENT ILLNESS
30. 00     Types: Cigarettes    Smokeless tobacco: Never Used    Alcohol use Not on file       Objective:   /82   Pulse 119   Temp 102.4 °F (39.1 °C)   Ht 6' 3.2\" (1.91 m)   Wt 222 lb 12.8 oz (101.1 kg)   SpO2 97%   BMI 27.70 kg/m²     Physical Exam   Constitutional: He is oriented to person, place, and time. He appears well-developed and well-nourished. He is cooperative. He has a sickly appearance. No distress. HENT:   Head: Normocephalic and atraumatic. Eyes: Conjunctivae and EOM are normal.   Neck: Neck supple. Cardiovascular: Normal rate, regular rhythm, S1 normal, S2 normal, normal heart sounds and intact distal pulses. Exam reveals no gallop. No murmur heard. Pulmonary/Chest: Effort normal and breath sounds normal. No accessory muscle usage. No respiratory distress. Abdominal: Soft. Normal appearance and bowel sounds are normal. There is no CVA tenderness. Neurological: He is alert and oriented to person, place, and time. Skin: Skin is warm and dry. No rash noted. Psychiatric: He has a normal mood and affect. His speech is normal and behavior is normal.       Assessment/Plan:   1. Frequency of urination  Patient presents today with complaints of dysuria, Reglan C, chills and fever. Patient reports symptoms began 3 days ago however fever and chills have developed over the past 24 hours. On exam no CVA tenderness noted and exam is essentially negative. UA is positive for moderate amount of blood, leukocytes and nitrites. Temp is 102.4 during visit. Question cystitis versus prostatitis. Recommend Rocephin 1 g IM ×1 today and for patient to start on Cipro 500 mg by mouth twice a day ×14 days. Discussed with patient the importance of taking all doses of antibiotics. Also advised patient to take over-the-counter probiotic daily, drink plenty of water and cranberry juice and patient to follow-up if no better or worsening of symptoms.   Advised patient that should his symptoms 34 yo  @ 18 2/7 weeks presents s/p previable pprom at 8:30am on 3/30 and vaginal bleeding yesterday. Patient was found to be anhydramnios. She was admitted and counseled on expectant management vs labor induction, due to the high risk of infection and fetal demise prior to viability. She decided on expectant management and was discharged home. She presented today wanting labor induction.     current a/p complications: IVF pregnancy. CHTN    PMHx: CHTN, anemia  PSHx: Abdominal myomectomy 2016  OB/GYN: hx of fibroids  Medications: pnv, iron, vit D, labetalol 100 bid  Allergies: NKDA  Social: Denies   Psychosocial: Denies

## 2019-03-31 NOTE — PROCEDURE NOTE - SUPERVISORY STATEMENT
18 weeks IUP IVF SROM 3/30/19; pt left to consider options and returned to LDR  Cytotec 400 ugms placed buccal and vaginal; Baby delivered intact. Mom and dad held the baby. No FH  Arrived to deliver placenta.  Placenta delivered partially with gentle traction and gentle removal with ring forceps.  Portion of placenta retained in uterus as visualized with sonographic guidance.  Decision made to proceed with removal of placenta in OR with suction and curettage. Parents explained.

## 2019-03-31 NOTE — CONSULT NOTE ADULT - SUBJECTIVE AND OBJECTIVE BOX
33 year old  with 18 weeks 2 day IVF pregnancy presented yesterday with confirmed rupture of membranes (found to be 1cm dilated with bulging membranes, amnisure positive) without fever, was counseled yesterday and elected expectant management and went home. She consulted with her IVF doctor who recommended active management so she returned today. Denies fevers, chills, foul-smelling discharge, vaginal bleeding and reports continue LOF.     PMH significant for chronic hypertension and obesity  History of myomectomy

## 2019-03-31 NOTE — OB PROVIDER DELIVERY SUMMARY - NSPROVIDERDELIVERYNOTE_OBGYN_ALL_OB_FT
non viable fetus delivered, footling breech presentation. No FH noted upon delivery  Placenta was clamped and cord cut. Fetus handed to RN.  400cc clot noted with fetus.    Placenta noted to be in situ. Hemabate and cytotec to be given to facilitate placenta delivery    Shira, PGY-3  d/w Dr. Negrete and Dr. Murphy

## 2019-03-31 NOTE — CONSULT NOTE ADULT - PROBLEM SELECTOR RECOMMENDATION 9
I reviewed again the options for management including induction of labor and dilation and evacuation. We discussed the risks and benefits of both options. Her cervix is already 1cm dilated. She and her partner desire to hold the baby after delivery, they understand that baby weighs less than 1 pound and is very premature. Because of this, they elect induction of labor, recommend vaginal cytotec.     She has had genetic screening during pregnancy and declines further genetic testing on fetus.     She declines autopsy.    Will proceed with misoprostol induction of labor, can give 400mcg orally and vaginally even with history of myomectomy.    Aicha Mahmood MD

## 2019-04-01 ENCOUNTER — TRANSCRIPTION ENCOUNTER (OUTPATIENT)
Age: 34
End: 2019-04-01

## 2019-04-01 VITALS — HEART RATE: 95 BPM | OXYGEN SATURATION: 100 %

## 2019-04-01 DIAGNOSIS — O42.00 PREMATURE RUPTURE OF MEMBRANES, ONSET OF LABOR WITHIN 24 HOURS OF RUPTURE, UNSPECIFIED WEEKS OF GESTATION: ICD-10-CM

## 2019-04-01 LAB
BASOPHILS # BLD AUTO: 0.03 K/UL — SIGNIFICANT CHANGE UP (ref 0–0.2)
BASOPHILS NFR BLD AUTO: 0.3 % — SIGNIFICANT CHANGE UP (ref 0–2)
EOSINOPHIL # BLD AUTO: 0.02 K/UL — SIGNIFICANT CHANGE UP (ref 0–0.5)
EOSINOPHIL NFR BLD AUTO: 0.2 % — SIGNIFICANT CHANGE UP (ref 0–6)
HCT VFR BLD CALC: 27.9 % — LOW (ref 34.5–45)
HGB BLD-MCNC: 8.7 G/DL — LOW (ref 11.5–15.5)
IMM GRANULOCYTES NFR BLD AUTO: 1 % — SIGNIFICANT CHANGE UP (ref 0–1.5)
LYMPHOCYTES # BLD AUTO: 1.1 K/UL — SIGNIFICANT CHANGE UP (ref 1–3.3)
LYMPHOCYTES # BLD AUTO: 9.5 % — LOW (ref 13–44)
MCHC RBC-ENTMCNC: 25.9 PG — LOW (ref 27–34)
MCHC RBC-ENTMCNC: 31.2 % — LOW (ref 32–36)
MCV RBC AUTO: 83 FL — SIGNIFICANT CHANGE UP (ref 80–100)
MONOCYTES # BLD AUTO: 0.69 K/UL — SIGNIFICANT CHANGE UP (ref 0–0.9)
MONOCYTES NFR BLD AUTO: 6 % — SIGNIFICANT CHANGE UP (ref 2–14)
NEUTROPHILS # BLD AUTO: 9.57 K/UL — HIGH (ref 1.8–7.4)
NEUTROPHILS NFR BLD AUTO: 83 % — HIGH (ref 43–77)
NRBC # FLD: 0 K/UL — SIGNIFICANT CHANGE UP (ref 0–0)
PLATELET # BLD AUTO: 235 K/UL — SIGNIFICANT CHANGE UP (ref 150–400)
PMV BLD: 11.4 FL — SIGNIFICANT CHANGE UP (ref 7–13)
RBC # BLD: 3.36 M/UL — LOW (ref 3.8–5.2)
RBC # FLD: 15.8 % — HIGH (ref 10.3–14.5)
WBC # BLD: 11.52 K/UL — HIGH (ref 3.8–10.5)
WBC # FLD AUTO: 11.52 K/UL — HIGH (ref 3.8–10.5)

## 2019-04-01 RX ORDER — FERROUS SULFATE 325(65) MG
1 TABLET ORAL
Qty: 0 | Refills: 0 | COMMUNITY

## 2019-04-01 RX ORDER — LABETALOL HCL 100 MG
1 TABLET ORAL
Qty: 0 | Refills: 0 | DISCHARGE
Start: 2019-04-01

## 2019-04-01 RX ORDER — LABETALOL HCL 100 MG
100 TABLET ORAL
Qty: 0 | Refills: 0 | COMMUNITY

## 2019-04-01 RX ADMIN — Medication 100 MILLIGRAM(S): at 06:24

## 2019-04-01 RX ADMIN — Medication 100 MILLIGRAM(S): at 10:37

## 2019-04-01 NOTE — DISCHARGE NOTE OB - ADDITIONAL INSTRUCTIONS
Follow-up in the office in 2 weeks. Call to make/confirm you appointment. Take ibuprofen or acetaminophen  as needed for pain    take doxycyline for 7 days  continue to take your blood pressure at home and take your labetalol.

## 2019-04-01 NOTE — CHART NOTE - NSCHARTNOTEFT_GEN_A_CORE
R2 NOTE    Pt evaluated at bedside. Pt states that she would like to go home. She is ambulating to the bathroom, voiding spontaneously. Denies dizziness or lightheadedness. Scant vaginal bleeding. Pain is well controlled. Patient denies chest pain, shortness of breath, fevers, chills, nausea, vomiting, diarrhea.      Vital Signs Last 24 Hours  T(C): 37.2 (04-01-19 @ 05:57), Max: 37.2 (04-01-19 @ 05:57)  HR: 104 (04-01-19 @ 08:25) (60 - 127)  BP: 115/56 (04-01-19 @ 07:57) (87/46 - 164/92)  RR: 15 (04-01-19 @ 05:57) (14 - 18)  SpO2: 100% (04-01-19 @ 08:25) (86% - 100%)    H/H: 8.7/27.9<-10.8/35.6<-10.8/35.7    s/p VD of FD, 18w PPROM, s/p D&C for placenta in situ  (EBL=900). Pt is HD stable. Vitals wnl. Scant vaginal bleeding at this time.  -Will consider rpt H/H. Last labs draw at 5am. No symptomatic anemia at this time  -Will consider d/c later this morning    To be d/w attending  Jordon, pgy2    Note update-  Will re-evaluate after breakfast. Will rpt H/H if pt develops symptomatic anemia  Will d/c with 7 day course of doxycycline    D/w Dr. Murphy

## 2019-04-01 NOTE — DISCHARGE NOTE OB - CARE PLAN
Principal Discharge DX:	IUFD at less than 20 weeks of gestation  Goal:	Recovery  Assessment and plan of treatment:	Follow-up in the office in 2 weeks. Call to make/confirm you appointment. Take ibuprofen or acetaminophen  as needed for pain

## 2019-04-01 NOTE — PROGRESS NOTE PEDS - SUBJECTIVE AND OBJECTIVE BOX
Anesthesia Post-op Note    POD#1 S/P vaginal delivery with epidural    Patient is doing well.  OOBAA. Tolerating clears.  Pain is tolerable.  No residual anesthetic issues or complications noted.

## 2019-04-01 NOTE — DISCHARGE NOTE OB - PATIENT PORTAL LINK FT
You can access the [facility] Patient Portal, offered by [facility], by registering with the following website: [email or web address]

## 2019-04-01 NOTE — DISCHARGE NOTE OB - PLAN OF CARE
Recovery Follow-up in the office in 2 weeks. Call to make/confirm you appointment. Take ibuprofen or acetaminophen  as needed for pain

## 2019-04-01 NOTE — DISCHARGE NOTE OB - MEDICATION SUMMARY - MEDICATIONS TO TAKE
I will START or STAY ON the medications listed below when I get home from the hospital:    acetaminophen 325 mg oral tablet  -- 2 tab(s) by mouth every 4 hours  -- Indication: For Pain    doxycycline hyclate 100 mg oral tablet  -- 1 tab(s) by mouth every 12 hours   -- Avoid prolonged or excessive exposure to direct and/or artificial sunlight while taking this medication.  Do not take this drug if you are pregnant.  Finish all this medication unless otherwise directed by prescriber.  Medication should be taken with plenty of water.    -- Indication: For Post-op    labetalol 100 mg oral tablet  -- 1 tab(s) by mouth every 12 hours  -- Indication: For htn

## 2019-04-01 NOTE — DISCHARGE NOTE OB - HOSPITAL COURSE
Pt had a IUFD at 18weeks, s/p dilation and vacuum curettage for retained placenta. Uncomplicated post-op course. Pt discharged on POD#1 and was meeting all appropriate post-op milestones. Scant vaginal bleeding at discharge. Pt was seen by the bereavement team prior to d/c.

## 2019-04-02 NOTE — ED ADULT TRIAGE NOTE - TEMPERATURE IN FAHRENHEIT (DEGREES F)
98.8
Quality 111:Pneumonia Vaccination Status For Older Adults: Pneumococcal Vaccination Previously Received
Detail Level: Detailed
Quality 110: Preventive Care And Screening: Influenza Immunization: Influenza Immunization Administered during Influenza season

## 2019-04-04 ENCOUNTER — APPOINTMENT (OUTPATIENT)
Dept: ANTEPARTUM | Facility: CLINIC | Age: 34
End: 2019-04-04

## 2019-04-15 ENCOUNTER — APPOINTMENT (OUTPATIENT)
Dept: ANTEPARTUM | Facility: CLINIC | Age: 34
End: 2019-04-15

## 2019-04-30 ENCOUNTER — APPOINTMENT (OUTPATIENT)
Dept: ANTEPARTUM | Facility: CLINIC | Age: 34
End: 2019-04-30
Payer: COMMERCIAL

## 2019-04-30 ENCOUNTER — ASOB RESULT (OUTPATIENT)
Age: 34
End: 2019-04-30

## 2019-04-30 PROCEDURE — 99241 OFFICE CONSULTATION NEW/ESTAB PATIENT 15 MIN: CPT | Mod: 25

## 2019-04-30 PROCEDURE — 76830 TRANSVAGINAL US NON-OB: CPT

## 2019-06-28 NOTE — ED ADULT TRIAGE NOTE - BP NONINVASIVE SYSTOLIC (MM HG)
[employer] introduces peerTransfer patient portal. Now you can access parts of your medical record, email your doctor's office, and request medication refills online. 1. In your internet browser, go to [email or web address] 
2. Click on the First Time User? Click Here link in the Sign In box. You will see the New Member Sign Up page. 3. Enter your peerTransfer Access Code exactly as it appears below. You will not need to use this code after youve completed the sign-up process. If you do not sign up before the expiration date, you must request a new code. · Your Image by [employer] Access Code: Activation code not generated · Patient does not meet minimum criteria for peerTransfer access. 4. Enter the last four digits of your Social Security Number (xxxx) and Date of Birth (mm/dd/yyyy) as indicated and click Submit. You will be taken to the next sign-up page. 5. Create a peerTransfer ID. This will be your peerTransfer login ID and cannot be changed, so think of one that is secure and easy to remember. 6. Create a peerTransfer password. You can change your password at any time. 7. Enter your Password Reset Question and Answer. This can be used at a later time if you forget your password. 8. Enter your e-mail address. You will receive e-mail notification when new information is available in [address] 9. Click Sign Up. You can now view and download portions of your medical record. 10. Click the Download Summary menu link to download a portable copy of your medical information. If you have questions, please visit the Frequently Asked Questions section of the peerTransfer website. Remember, peerTransfer is NOT to be used for urgent needs. For medical emergencies, dial 911. Now available from your iPhone and Android!
135

## 2019-07-16 ENCOUNTER — OUTPATIENT (OUTPATIENT)
Dept: OUTPATIENT SERVICES | Facility: HOSPITAL | Age: 34
LOS: 1 days | End: 2019-07-16
Payer: COMMERCIAL

## 2019-07-16 VITALS
HEIGHT: 62 IN | DIASTOLIC BLOOD PRESSURE: 97 MMHG | SYSTOLIC BLOOD PRESSURE: 144 MMHG | WEIGHT: 229.94 LBS | OXYGEN SATURATION: 98 % | RESPIRATION RATE: 18 BRPM | TEMPERATURE: 98 F | HEART RATE: 109 BPM

## 2019-07-16 DIAGNOSIS — Z01.818 ENCOUNTER FOR OTHER PREPROCEDURAL EXAMINATION: ICD-10-CM

## 2019-07-16 DIAGNOSIS — Z98.890 OTHER SPECIFIED POSTPROCEDURAL STATES: Chronic | ICD-10-CM

## 2019-07-16 DIAGNOSIS — N84.0 POLYP OF CORPUS UTERI: ICD-10-CM

## 2019-07-16 LAB
ANION GAP SERPL CALC-SCNC: 15 MMO/L — HIGH (ref 7–14)
BUN SERPL-MCNC: 13 MG/DL — SIGNIFICANT CHANGE UP (ref 7–23)
CALCIUM SERPL-MCNC: 10.1 MG/DL — SIGNIFICANT CHANGE UP (ref 8.4–10.5)
CHLORIDE SERPL-SCNC: 100 MMOL/L — SIGNIFICANT CHANGE UP (ref 98–107)
CO2 SERPL-SCNC: 23 MMOL/L — SIGNIFICANT CHANGE UP (ref 22–31)
CREAT SERPL-MCNC: 0.66 MG/DL — SIGNIFICANT CHANGE UP (ref 0.5–1.3)
GLUCOSE SERPL-MCNC: 166 MG/DL — HIGH (ref 70–99)
HCT VFR BLD CALC: 37.7 % — SIGNIFICANT CHANGE UP (ref 34.5–45)
HGB BLD-MCNC: 11.2 G/DL — LOW (ref 11.5–15.5)
MCHC RBC-ENTMCNC: 22.2 PG — LOW (ref 27–34)
MCHC RBC-ENTMCNC: 29.7 % — LOW (ref 32–36)
MCV RBC AUTO: 74.7 FL — LOW (ref 80–100)
NRBC # FLD: 0 K/UL — SIGNIFICANT CHANGE UP (ref 0–0)
PLATELET # BLD AUTO: 311 K/UL — SIGNIFICANT CHANGE UP (ref 150–400)
PMV BLD: 10.3 FL — SIGNIFICANT CHANGE UP (ref 7–13)
POTASSIUM SERPL-MCNC: 3.6 MMOL/L — SIGNIFICANT CHANGE UP (ref 3.5–5.3)
POTASSIUM SERPL-SCNC: 3.6 MMOL/L — SIGNIFICANT CHANGE UP (ref 3.5–5.3)
RBC # BLD: 5.05 M/UL — SIGNIFICANT CHANGE UP (ref 3.8–5.2)
RBC # FLD: 17.4 % — HIGH (ref 10.3–14.5)
SODIUM SERPL-SCNC: 138 MMOL/L — SIGNIFICANT CHANGE UP (ref 135–145)
WBC # BLD: 8.96 K/UL — SIGNIFICANT CHANGE UP (ref 3.8–10.5)
WBC # FLD AUTO: 8.96 K/UL — SIGNIFICANT CHANGE UP (ref 3.8–10.5)

## 2019-07-16 PROCEDURE — 93010 ELECTROCARDIOGRAM REPORT: CPT

## 2019-07-16 RX ORDER — SODIUM CHLORIDE 9 MG/ML
1000 INJECTION, SOLUTION INTRAVENOUS
Refills: 0 | Status: DISCONTINUED | OUTPATIENT
Start: 2019-07-22 | End: 2019-08-09

## 2019-07-16 NOTE — H&P PST ADULT - NEGATIVE FEMALE-SPECIFIC SYMPTOMS
no dysmenorrhea/no amenorrhea/no pelvic pain/no menorrhagia/no abnormal vaginal bleeding/no irregular menses/no vaginal discharge/no spotting

## 2019-07-16 NOTE — H&P PST ADULT - NSICDXFAMILYHX_GEN_ALL_CORE_FT
FAMILY HISTORY:  Family history of borderline diabetes mellitus, hx/o pre-diabetes in father  Family history of hypertension

## 2019-07-16 NOTE — H&P PST ADULT - MUSCULOSKELETAL
negative detailed exam no calf tenderness/no joint swelling/no joint erythema/normal strength/ROM intact/no joint warmth

## 2019-07-16 NOTE — H&P PST ADULT - NSICDXPASTSURGICALHX_GEN_ALL_CORE_FT
PAST SURGICAL HISTORY:  H/O myomectomy 4/2016    H/O myomectomy abdominal-2016    S/P dilatation and curettage 3/2019 after misscarriage PAST SURGICAL HISTORY:  H/O myomectomy 4/2016    H/O myomectomy abdominal-2016    S/P dilatation and curettage 3/2019 after miscarriage

## 2019-07-16 NOTE — H&P PST ADULT - RS GEN PE MLT RESP DETAILS PC
breath sounds equal/airway patent/no rhonchi/no subcutaneous emphysema/no rales/good air movement/no intercostal retractions/respirations non-labored/clear to auscultation bilaterally/no chest wall tenderness

## 2019-07-16 NOTE — H&P PST ADULT - NSICDXPROBLEM_GEN_ALL_CORE_FT
PROBLEM DIAGNOSES  Problem: Polyp of corpus uteri  Assessment and Plan: PROBLEM DIAGNOSES  Problem: Polyp of corpus uteri  Assessment and Plan: polyp of corpus uteri and evaluated for a scheduled D&C hysteroscopy [place] on 7/22/19. preop instructions provided including NPO status, Pepcid, ucg for am dos. verbalized understanding  pt has elevated Blood Pressure and HR while at pst, states told to stop labetalol by GYN since did not need it any longer after pregnancy. pt still has some at home. Aware of need to address bp, will call gyn in am , refuses to see pcp. form provided if MC requested by anesthesia. Pt will re start labetalol. labs pending. meets FRANK precautions criteria, OR booking aware.

## 2019-07-16 NOTE — H&P PST ADULT - NSICDXPASTMEDICALHX_GEN_ALL_CORE_FT
PAST MEDICAL HISTORY:  Anemia     Anemia     Chronic hypertension no current meds    Intramural leiomyoma of uterus     Menorrhagia     Obesity (BMI 30-39.9)

## 2019-07-16 NOTE — H&P PST ADULT - HISTORY OF PRESENT ILLNESS
32 y/o female. presents to pst w/ a preop dx of polyp of corpus uteri and to be evaluated for a scheduled D&C hysteroscopy [place] on 19.   Pt states is attempting to become pregnant, had a miscarriage on 3/2019 due to incompetent cervix. Pt has a hx of fibroids s/p myomectomy on . On April an US done TV and noted a polyp so a D&C recommended at this time. Pt is , LMP 19.

## 2019-07-16 NOTE — H&P PST ADULT - NEGATIVE GENERAL GENITOURINARY SYMPTOMS
no hematuria/normal libido/no renal colic/no incontinence/no urinary hesitancy/no nocturia/no dysuria/normal urinary frequency/no urine discoloration/no bladder infections/no flank pain L/no flank pain R

## 2019-07-21 ENCOUNTER — TRANSCRIPTION ENCOUNTER (OUTPATIENT)
Age: 34
End: 2019-07-21

## 2019-07-22 ENCOUNTER — OUTPATIENT (OUTPATIENT)
Dept: OUTPATIENT SERVICES | Facility: HOSPITAL | Age: 34
LOS: 1 days | Discharge: HOME | End: 2019-07-22
Payer: COMMERCIAL

## 2019-07-22 ENCOUNTER — RESULT REVIEW (OUTPATIENT)
Age: 34
End: 2019-07-22

## 2019-07-22 VITALS
OXYGEN SATURATION: 100 % | DIASTOLIC BLOOD PRESSURE: 81 MMHG | RESPIRATION RATE: 16 BRPM | SYSTOLIC BLOOD PRESSURE: 128 MMHG | HEART RATE: 82 BPM

## 2019-07-22 VITALS
OXYGEN SATURATION: 98 % | WEIGHT: 229.94 LBS | HEIGHT: 62 IN | HEART RATE: 88 BPM | RESPIRATION RATE: 16 BRPM | SYSTOLIC BLOOD PRESSURE: 136 MMHG | DIASTOLIC BLOOD PRESSURE: 75 MMHG | TEMPERATURE: 98 F

## 2019-07-22 DIAGNOSIS — Z98.890 OTHER SPECIFIED POSTPROCEDURAL STATES: Chronic | ICD-10-CM

## 2019-07-22 DIAGNOSIS — N84.0 POLYP OF CORPUS UTERI: ICD-10-CM

## 2019-07-22 LAB — HCG UR QL: NEGATIVE — SIGNIFICANT CHANGE UP

## 2019-07-22 PROCEDURE — 88305 TISSUE EXAM BY PATHOLOGIST: CPT | Mod: 26

## 2019-07-22 NOTE — BRIEF OPERATIVE NOTE - OPERATION/FINDINGS
anteverted uterus  Vaginal canal and cervix grossly wnl  Possible POC on the fundal and posterior right portion of uterus.

## 2019-07-22 NOTE — BRIEF OPERATIVE NOTE - NSICDXBRIEFPOSTOP_GEN_ALL_CORE_FT
POST-OP DIAGNOSIS:  Retained products of conception after miscarriage 22-Jul-2019 10:17:54  Burt Hernandez

## 2019-07-22 NOTE — ASU DISCHARGE PLAN (ADULT/PEDIATRIC) - CALL YOUR DOCTOR IF YOU HAVE ANY OF THE FOLLOWING:
Fever greater than (need to indicate Fahrenheit or Celsius)/Excessive diarrhea/Numbness, tingling, color or temperature change to extremity/Wound/Surgical Site with redness, or foul smelling discharge or pus/Unable to urinate/Inability to tolerate liquids or foods/Nausea and vomiting that does not stop

## 2019-07-22 NOTE — BRIEF OPERATIVE NOTE - ESTIMATED BLOOD LOSS
SW/CM Discharge Plan   informed in OFTs that patient is ready for discharge today. Patient’s discharge destination is  home with [facility] services through [facility] [place]. Patient to be picked up by friend-glory rowley prior to noon.  Patient/interested person has been counseled for post hospitalization care.  Patient agrees and understands goals and plan. Initial implementation of the patient’s discharge plan has been arranged, including any devices/equipment needed for discharge. Discharge plan communicated to MD, RN, [facility] and Receiving Facility/Agency.      Important Message From Medicare (IMM)- 2nd copy of pages 1 & 2, explained/reviewed with patient prior to discharge.  Patient/representative verbalized understanding and signature obtained.  Copies placed in file for medical records.     TERESITA Dominique, APSW    [facility]3]   Cell: [phone]  On weekends please call: [phone]   30

## 2019-07-22 NOTE — ASU DISCHARGE PLAN (ADULT/PEDIATRIC) - NURSING INSTRUCTIONS
Next Dose to take Tylenol @3:45pm  Next Dose to take Motrin @4pm You received IV Tylenol for pain management at 9:45AM. Please DO NOT take any Tylenol (Acetaminophen) containing products, such as Vicodin, Percocet, Excedrin, and cold medications for the next 6 hours until 3:45PM. DO NOT TAKE MORE THAN 3000 MG OF TYLENOL in a 24 hour period.   You received IV Toradol for pain management at 10AM. Please DO NOT take Motrin/Ibuprofen/Advil/Aleve/NSAIDs (Non-Steroidal Anti-Inflammatory Drugs) for the next 6 hours until 4PM

## 2020-03-18 NOTE — H&P PST ADULT - LIVES WITH, PROFILE
Reviewed meds with pt. . Sending instructions thru My Chart.  MD Nadia Ingram, SERJIO             Dear Nadia,   Yes, she is at an acceptable cardiovascular risk for her upcoming bilateral mastectomy.   Best,PV    Previous Messages      ----- Message -----   From: Stefanie Cortez MA   Sent: 3/18/2020   3:11 PM CDT   To: Estephania San MD     please advise   ----- Message -----   From: Nadia Jamison RN   Sent: 3/18/2020   2:32 PM CDT   To: Estephania San MD, Jaswinder Best Staff     Pt. Seen by you recently, scheduled for bilateral simple mastectomy, lymp node biopsy, sentinel node biopsy, alayna breast tissue expander by Dr. Lopez// Dr. Solorzano 3/24 -surgery 5 hours . Do you feel pt. Is optimal to proceed.       ALOK Jamison Rn Bc   Pre-op anesthesia           
spouse

## 2020-08-15 ENCOUNTER — APPOINTMENT (OUTPATIENT)
Dept: OBGYN | Facility: CLINIC | Age: 35
End: 2020-08-15
Payer: COMMERCIAL

## 2020-08-15 PROCEDURE — 99203 OFFICE O/P NEW LOW 30 MIN: CPT

## 2020-08-25 ENCOUNTER — RESULT REVIEW (OUTPATIENT)
Age: 35
End: 2020-08-25

## 2020-09-18 ENCOUNTER — APPOINTMENT (OUTPATIENT)
Dept: ANTEPARTUM | Facility: CLINIC | Age: 35
End: 2020-09-18
Payer: COMMERCIAL

## 2020-09-18 PROCEDURE — 76813 OB US NUCHAL MEAS 1 GEST: CPT

## 2020-09-24 ENCOUNTER — APPOINTMENT (OUTPATIENT)
Dept: ANTEPARTUM | Facility: CLINIC | Age: 35
End: 2020-09-24
Payer: COMMERCIAL

## 2020-09-24 PROCEDURE — 76816 OB US FOLLOW-UP PER FETUS: CPT

## 2020-09-30 ENCOUNTER — OUTPATIENT (OUTPATIENT)
Dept: OUTPATIENT SERVICES | Facility: HOSPITAL | Age: 35
LOS: 1 days | End: 2020-09-30

## 2020-09-30 VITALS
HEIGHT: 63 IN | RESPIRATION RATE: 14 BRPM | HEART RATE: 86 BPM | OXYGEN SATURATION: 98 % | TEMPERATURE: 97 F | WEIGHT: 225.97 LBS | DIASTOLIC BLOOD PRESSURE: 78 MMHG | SYSTOLIC BLOOD PRESSURE: 132 MMHG

## 2020-09-30 DIAGNOSIS — Z98.890 OTHER SPECIFIED POSTPROCEDURAL STATES: Chronic | ICD-10-CM

## 2020-09-30 DIAGNOSIS — N88.3 INCOMPETENCE OF CERVIX UTERI: ICD-10-CM

## 2020-09-30 LAB
BLD GP AB SCN SERPL QL: NEGATIVE — SIGNIFICANT CHANGE UP
HCT VFR BLD CALC: 33.7 % — LOW (ref 34.5–45)
HGB BLD-MCNC: 10.3 G/DL — LOW (ref 11.5–15.5)
MCHC RBC-ENTMCNC: 24.4 PG — LOW (ref 27–34)
MCHC RBC-ENTMCNC: 30.6 % — LOW (ref 32–36)
MCV RBC AUTO: 79.9 FL — LOW (ref 80–100)
NRBC # FLD: 0 K/UL — SIGNIFICANT CHANGE UP (ref 0–0)
PLATELET # BLD AUTO: 263 K/UL — SIGNIFICANT CHANGE UP (ref 150–400)
PMV BLD: 11 FL — SIGNIFICANT CHANGE UP (ref 7–13)
RBC # BLD: 4.22 M/UL — SIGNIFICANT CHANGE UP (ref 3.8–5.2)
RBC # FLD: 15.1 % — HIGH (ref 10.3–14.5)
RH IG SCN BLD-IMP: POSITIVE — SIGNIFICANT CHANGE UP
WBC # BLD: 10.6 K/UL — HIGH (ref 3.8–10.5)
WBC # FLD AUTO: 10.6 K/UL — HIGH (ref 3.8–10.5)

## 2020-09-30 NOTE — H&P PST ADULT - RS GEN PE MLT RESP DETAILS PC
no wheezes/clear to auscultation bilaterally/respirations non-labored/airway patent/good air movement/no rales/breath sounds equal/no rhonchi

## 2020-09-30 NOTE — H&P PST ADULT - ASSESSMENT
Problem:  incompetence of cervix uteri  Assessment and Plan: Patient scheduled for surgery on 10/6/2020  Patient provided with verbal and written presurgical instructions; verbalized understanding  with teach back.    Patient provided with COVID reminder and instructions

## 2020-09-30 NOTE — H&P PST ADULT - NSICDXPASTMEDICALHX_GEN_ALL_CORE_FT
PAST MEDICAL HISTORY:  Anemia     Anemia     Chronic hypertension no current meds, denies at PST    Incompetence of cervix uteri     Intramural leiomyoma of uterus     Menorrhagia     Missed      Obesity (BMI 30-39.9)      PAST MEDICAL HISTORY:  Anemia     Chronic hypertension no current meds, denies at PST    Incompetence of cervix uteri     Intramural leiomyoma of uterus     Menorrhagia     Missed      Obesity (BMI 30-39.9)

## 2020-09-30 NOTE — H&P PST ADULT - NSICDXFAMILYHX_GEN_ALL_CORE_FT
FAMILY HISTORY:  Family history of borderline diabetes mellitus, diabetes in father  Family history of hypertension, parents

## 2020-09-30 NOTE — H&P PST ADULT - HISTORY OF PRESENT ILLNESS
34 year old female with history missed  in 2019 presents for PST eval for cerclage placement on 10- . Patient denies any vaginal discharge or bleeding,

## 2020-10-01 DIAGNOSIS — Z01.818 ENCOUNTER FOR OTHER PREPROCEDURAL EXAMINATION: ICD-10-CM

## 2020-10-03 ENCOUNTER — APPOINTMENT (OUTPATIENT)
Dept: DISASTER EMERGENCY | Facility: CLINIC | Age: 35
End: 2020-10-03

## 2020-10-03 LAB — SARS-COV-2 N GENE NPH QL NAA+PROBE: NOT DETECTED

## 2020-10-05 ENCOUNTER — TRANSCRIPTION ENCOUNTER (OUTPATIENT)
Age: 35
End: 2020-10-05

## 2020-10-06 ENCOUNTER — TRANSCRIPTION ENCOUNTER (OUTPATIENT)
Age: 35
End: 2020-10-06

## 2020-10-06 ENCOUNTER — OUTPATIENT (OUTPATIENT)
Dept: OUTPATIENT SERVICES | Facility: HOSPITAL | Age: 35
LOS: 1 days | Discharge: HOME | End: 2020-10-06

## 2020-10-06 VITALS
DIASTOLIC BLOOD PRESSURE: 66 MMHG | SYSTOLIC BLOOD PRESSURE: 117 MMHG | RESPIRATION RATE: 24 BRPM | OXYGEN SATURATION: 100 % | HEART RATE: 89 BPM

## 2020-10-06 VITALS — HEART RATE: 81 BPM | DIASTOLIC BLOOD PRESSURE: 76 MMHG | SYSTOLIC BLOOD PRESSURE: 128 MMHG

## 2020-10-06 DIAGNOSIS — Z3A.00 WEEKS OF GESTATION OF PREGNANCY NOT SPECIFIED: ICD-10-CM

## 2020-10-06 DIAGNOSIS — Z98.890 OTHER SPECIFIED POSTPROCEDURAL STATES: Chronic | ICD-10-CM

## 2020-10-06 DIAGNOSIS — N88.3 INCOMPETENCE OF CERVIX UTERI: ICD-10-CM

## 2020-10-06 DIAGNOSIS — O26.899 OTHER SPECIFIED PREGNANCY RELATED CONDITIONS, UNSPECIFIED TRIMESTER: ICD-10-CM

## 2020-10-06 PROBLEM — I10 ESSENTIAL (PRIMARY) HYPERTENSION: Chronic | Status: ACTIVE | Noted: 2019-03-30

## 2020-10-06 PROBLEM — O02.1 MISSED ABORTION: Chronic | Status: ACTIVE | Noted: 2020-09-30

## 2020-10-06 LAB
BLD GP AB SCN SERPL QL: NEGATIVE — SIGNIFICANT CHANGE UP
HCT VFR BLD CALC: 33.8 % — LOW (ref 34.5–45)
HGB BLD-MCNC: 10.4 G/DL — LOW (ref 11.5–15.5)
MCHC RBC-ENTMCNC: 24.2 PG — LOW (ref 27–34)
MCHC RBC-ENTMCNC: 30.8 % — LOW (ref 32–36)
MCV RBC AUTO: 78.8 FL — LOW (ref 80–100)
NRBC # FLD: 0 K/UL — SIGNIFICANT CHANGE UP (ref 0–0)
PLATELET # BLD AUTO: 253 K/UL — SIGNIFICANT CHANGE UP (ref 150–400)
PMV BLD: 10.3 FL — SIGNIFICANT CHANGE UP (ref 7–13)
RBC # BLD: 4.29 M/UL — SIGNIFICANT CHANGE UP (ref 3.8–5.2)
RBC # FLD: 15.3 % — HIGH (ref 10.3–14.5)
RH IG SCN BLD-IMP: POSITIVE — SIGNIFICANT CHANGE UP
WBC # BLD: 8.3 K/UL — SIGNIFICANT CHANGE UP (ref 3.8–10.5)
WBC # FLD AUTO: 8.3 K/UL — SIGNIFICANT CHANGE UP (ref 3.8–10.5)

## 2020-10-06 RX ORDER — INDOMETHACIN 50 MG
1 CAPSULE ORAL
Qty: 7 | Refills: 0
Start: 2020-10-06 | End: 2020-10-07

## 2020-10-06 RX ORDER — SODIUM CHLORIDE 9 MG/ML
1000 INJECTION, SOLUTION INTRAVENOUS
Refills: 0 | Status: DISCONTINUED | OUTPATIENT
Start: 2020-10-06 | End: 2020-10-06

## 2020-10-06 RX ORDER — SODIUM CHLORIDE 9 MG/ML
1000 INJECTION, SOLUTION INTRAVENOUS ONCE
Refills: 0 | Status: COMPLETED | OUTPATIENT
Start: 2020-10-06 | End: 2020-10-06

## 2020-10-06 RX ORDER — METOCLOPRAMIDE HCL 10 MG
10 TABLET ORAL ONCE
Refills: 0 | Status: COMPLETED | OUTPATIENT
Start: 2020-10-06 | End: 2020-10-06

## 2020-10-06 RX ORDER — INDOMETHACIN 50 MG
25 CAPSULE ORAL EVERY 6 HOURS
Refills: 0 | Status: DISCONTINUED | OUTPATIENT
Start: 2020-10-06 | End: 2020-10-06

## 2020-10-06 RX ORDER — INDOMETHACIN 50 MG
50 CAPSULE ORAL ONCE
Refills: 0 | Status: COMPLETED | OUTPATIENT
Start: 2020-10-06 | End: 2020-10-06

## 2020-10-06 RX ORDER — CITRIC ACID/SODIUM CITRATE 300-500 MG
30 SOLUTION, ORAL ORAL ONCE
Refills: 0 | Status: COMPLETED | OUTPATIENT
Start: 2020-10-06 | End: 2020-10-06

## 2020-10-06 RX ORDER — FAMOTIDINE 10 MG/ML
20 INJECTION INTRAVENOUS ONCE
Refills: 0 | Status: COMPLETED | OUTPATIENT
Start: 2020-10-06 | End: 2020-10-06

## 2020-10-06 RX ADMIN — Medication 10 MILLIGRAM(S): at 07:48

## 2020-10-06 RX ADMIN — SODIUM CHLORIDE 2000 MILLILITER(S): 9 INJECTION, SOLUTION INTRAVENOUS at 07:49

## 2020-10-06 RX ADMIN — Medication 50 MILLIGRAM(S): at 07:49

## 2020-10-06 RX ADMIN — SODIUM CHLORIDE 200 MILLILITER(S): 9 INJECTION, SOLUTION INTRAVENOUS at 07:49

## 2020-10-06 RX ADMIN — Medication 30 MILLILITER(S): at 07:49

## 2020-10-06 RX ADMIN — FAMOTIDINE 20 MILLIGRAM(S): 10 INJECTION INTRAVENOUS at 07:48

## 2020-10-06 RX ADMIN — Medication 25 MILLIGRAM(S): at 14:21

## 2020-10-06 NOTE — OB PROVIDER H&P - NSHPPHYSICALEXAM_GEN_ALL_CORE
T(C): --  HR: 81 (10-06-20 @ 07:07) (81 - 81)  BP: 128/76 (10-06-20 @ 07:07) (128/76 - 128/76)    A&Ox3  Heart: RRR, S1&S2, no S3  Lungs: Clear bilateral to auscultation, good inspiratory /expiratory effort              no rhonchi, no rales  Abd: soft, Gravid, TOCO in place           no scar  Bedside TLTAS: confirmed FH , FM  Ext:  FROM /minimal edema LE/-B/L  Neuro: grossly intact

## 2020-10-06 NOTE — OB RN PATIENT PROFILE - PMH
Anemia    Chronic hypertension  no current meds, denies at PST  Incompetence of cervix uteri    Intramural leiomyoma of uterus    Menorrhagia    Missed     Obesity (BMI 30-39.9)

## 2020-10-06 NOTE — DISCHARGE NOTE ANTEPARTUM - HOSPITAL COURSE
34yo  @ 14w2d admitted for placement of history indicated cerclage. See operative note for details of procedure.  POD#0, patient was advanced to a regular diet. Mcneill was discontinued and patient voided spontaneously. She received Indocin 25mg, to continue for 48hr. Patient was discharged on POD#0 in stable condition with adequate pain control, ambulating, tolerating PO and voiding spontaneously.  Patient to follow up with Dr. Ward in 2 weeks.

## 2020-10-06 NOTE — DISCHARGE NOTE ANTEPARTUM - PATIENT PORTAL LINK FT
You can access the FollowMyHealth Patient Portal offered by [facility] by registering at the following website: [email or web address]. By joining [employer]’s FollowMyHealth portal, you will also be able to view your health information using other applications (apps) compatible with our system.

## 2020-10-06 NOTE — DISCHARGE NOTE ANTEPARTUM - MEDICATION SUMMARY - MEDICATIONS TO TAKE
I will START or STAY ON the medications listed below when I get home from the hospital:    indomethacin 25 mg oral capsule  -- 1 cap(s) by mouth every 6 hours MDD:4  -- Indication: For post cerclage    PNV Prenatal oral tablet  -- 1 tab(s) by mouth once a day  -- Indication: For home medication    ferrous sulfate 325 mg (65 mg elemental iron) oral tablet  -- 1 tab(s) by mouth once a day  -- Indication: For home medication    Vitamin D3 1000 intl units (25 mcg) oral tablet  -- 1 tab(s) by mouth once a day  -- Indication: For home medication

## 2020-10-06 NOTE — OB RN PATIENT PROFILE - ABORTIONS, OB PROFILE
The male child is brought to the procedure room after informed consent obtained from the mother/or responsible guardian. Risk and Benefits explained. After securing the infant to the Circ board, the groin is prepped and draped in sterile fashion. Emla cream was placed on penis 30 mins prior to procedure. 1% Lidocaine is used to perform a DPB injecting 0.4cc at the base of penis at 2'oclock and 10 o'clock position. Sweet ease is administered during procedure via pacifier. Gomco 1.3 used to remove foreskin in typical fashion. Hemostasis achieved and Surgicele appied. Pt. Tolerated procedure well without complications.     JAZ Joe M.D.     1

## 2020-10-06 NOTE — DISCHARGE NOTE ANTEPARTUM - CARE PLAN
Principal Discharge DX:	Incompetence of cervix uteri  Goal:	Recovery  Assessment and plan of treatment:	After discharge, please stay on pelvic rest, meaning no sexual intercourse, no tampons and no douching.  No lifting objects heavier than 10lbs. Expect to have vaginal spotting for first few days after procedure. For loss of fluid, abdominal pain, or bleeding soaking more than a pad an hour or passing clots greater than the size of your fist, come in to Labor and Delivery Triage.    Follow up with your provider as scheduled.

## 2020-10-06 NOTE — OB PROVIDER H&P - ASSESSMENT
Admit to FINN Ward MD-Service  Dx: scheduled cerclage placement  Dx: Cervical Insufficiency  NPO  routine labs  Bedside TLTAS  anesthesia consult  Graciela Vázquez PAC  10-06-20 @ 07:26

## 2020-10-06 NOTE — OB PROVIDER H&P - HISTORY OF PRESENT ILLNESS
36yo female  [facility] 2021 presents@ 14.2 wks for scheduled cerclage placement due to cervical insufficiency. sAB deliveries @ 18wks  AP course  otherwise unremarkable.   Denies VB,ROM or UCs today.  +FM 36yo female  [facility] 2021 presents@ 14.2 wks for scheduled cerclage placement due to cervical insufficiency. sAB deliveries @ 18wks  AP course  otherwise unremarkable.  COVID test negative, 10/3/2020.  Denies VB,ROM or UCs today.  +FM

## 2020-10-06 NOTE — DISCHARGE NOTE ANTEPARTUM - CARE PROVIDER_API CALL
Viola Ward  OBS-GYN - GENERAL  [address] [place] [address]  Phone: [phone]  Fax: [phone]  Follow Up Time:

## 2020-10-06 NOTE — DISCHARGE NOTE ANTEPARTUM - PLAN OF CARE
Recovery After discharge, please stay on pelvic rest, meaning no sexual intercourse, no tampons and no douching.  No lifting objects heavier than 10lbs. Expect to have vaginal spotting for first few days after procedure. For loss of fluid, abdominal pain, or bleeding soaking more than a pad an hour or passing clots greater than the size of your fist, come in to Labor and Delivery Triage.    Follow up with your provider as scheduled.

## 2020-10-06 NOTE — OB RN PATIENT PROFILE - PSH
H/O myomectomy  abdominal-2016. pt received blood transfusion prior to surgery due to low hemoglobin  S/P dilatation and curettage  3/2019 after misscarriage

## 2020-10-08 ENCOUNTER — APPOINTMENT (OUTPATIENT)
Dept: ANTEPARTUM | Facility: CLINIC | Age: 35
End: 2020-10-08
Payer: COMMERCIAL

## 2020-10-08 PROCEDURE — 76817 TRANSVAGINAL US OBSTETRIC: CPT

## 2020-10-08 PROCEDURE — 76816 OB US FOLLOW-UP PER FETUS: CPT

## 2020-10-12 DIAGNOSIS — N88.3 INCOMPETENCE OF CERVIX UTERI: ICD-10-CM

## 2020-10-15 ENCOUNTER — APPOINTMENT (OUTPATIENT)
Dept: ANTEPARTUM | Facility: CLINIC | Age: 35
End: 2020-10-15
Payer: COMMERCIAL

## 2020-10-15 PROCEDURE — 76817 TRANSVAGINAL US OBSTETRIC: CPT

## 2020-10-26 ENCOUNTER — APPOINTMENT (OUTPATIENT)
Dept: ANTEPARTUM | Facility: CLINIC | Age: 35
End: 2020-10-26
Payer: COMMERCIAL

## 2020-10-26 PROCEDURE — 76817 TRANSVAGINAL US OBSTETRIC: CPT

## 2020-10-26 PROCEDURE — 99072 ADDL SUPL MATRL&STAF TM PHE: CPT

## 2020-11-09 ENCOUNTER — APPOINTMENT (OUTPATIENT)
Dept: ANTEPARTUM | Facility: CLINIC | Age: 35
End: 2020-11-09
Payer: COMMERCIAL

## 2020-11-09 PROCEDURE — 76816 OB US FOLLOW-UP PER FETUS: CPT

## 2020-11-09 PROCEDURE — 76817 TRANSVAGINAL US OBSTETRIC: CPT

## 2020-11-09 PROCEDURE — 99072 ADDL SUPL MATRL&STAF TM PHE: CPT

## 2020-11-16 ENCOUNTER — APPOINTMENT (OUTPATIENT)
Dept: ANTEPARTUM | Facility: CLINIC | Age: 35
End: 2020-11-16
Payer: COMMERCIAL

## 2020-11-16 PROCEDURE — 76811 OB US DETAILED SNGL FETUS: CPT

## 2020-11-16 PROCEDURE — 0502F SUBSEQUENT PRENATAL CARE: CPT

## 2020-11-16 PROCEDURE — 76817 TRANSVAGINAL US OBSTETRIC: CPT

## 2020-11-30 ENCOUNTER — APPOINTMENT (OUTPATIENT)
Dept: ANTEPARTUM | Facility: CLINIC | Age: 35
End: 2020-11-30
Payer: COMMERCIAL

## 2020-11-30 PROCEDURE — 76817 TRANSVAGINAL US OBSTETRIC: CPT

## 2020-11-30 PROCEDURE — 99072 ADDL SUPL MATRL&STAF TM PHE: CPT

## 2021-01-11 ENCOUNTER — APPOINTMENT (OUTPATIENT)
Dept: ANTEPARTUM | Facility: CLINIC | Age: 36
End: 2021-01-11
Payer: COMMERCIAL

## 2021-01-11 PROCEDURE — 76819 FETAL BIOPHYS PROFIL W/O NST: CPT

## 2021-01-11 PROCEDURE — 76816 OB US FOLLOW-UP PER FETUS: CPT

## 2021-01-11 PROCEDURE — 99072 ADDL SUPL MATRL&STAF TM PHE: CPT

## 2021-02-08 ENCOUNTER — APPOINTMENT (OUTPATIENT)
Dept: ANTEPARTUM | Facility: CLINIC | Age: 36
End: 2021-02-08
Payer: COMMERCIAL

## 2021-02-08 PROCEDURE — 76819 FETAL BIOPHYS PROFIL W/O NST: CPT

## 2021-02-08 PROCEDURE — 76816 OB US FOLLOW-UP PER FETUS: CPT

## 2021-02-08 PROCEDURE — 99072 ADDL SUPL MATRL&STAF TM PHE: CPT

## 2021-03-09 ENCOUNTER — OUTPATIENT (OUTPATIENT)
Dept: OUTPATIENT SERVICES | Facility: HOSPITAL | Age: 36
LOS: 1 days | End: 2021-03-09

## 2021-03-09 ENCOUNTER — OUTPATIENT (OUTPATIENT)
Dept: INPATIENT UNIT | Facility: HOSPITAL | Age: 36
LOS: 1 days | Discharge: HOME | End: 2021-03-09

## 2021-03-09 VITALS
RESPIRATION RATE: 16 BRPM | DIASTOLIC BLOOD PRESSURE: 70 MMHG | OXYGEN SATURATION: 99 % | SYSTOLIC BLOOD PRESSURE: 134 MMHG | HEART RATE: 95 BPM | WEIGHT: 227.96 LBS | TEMPERATURE: 98 F | HEIGHT: 62 IN

## 2021-03-09 VITALS
HEART RATE: 88 BPM | DIASTOLIC BLOOD PRESSURE: 86 MMHG | SYSTOLIC BLOOD PRESSURE: 141 MMHG | TEMPERATURE: 98 F | RESPIRATION RATE: 16 BRPM

## 2021-03-09 VITALS — HEART RATE: 88 BPM | SYSTOLIC BLOOD PRESSURE: 141 MMHG | DIASTOLIC BLOOD PRESSURE: 81 MMHG

## 2021-03-09 DIAGNOSIS — Z98.890 OTHER SPECIFIED POSTPROCEDURAL STATES: Chronic | ICD-10-CM

## 2021-03-09 DIAGNOSIS — O34.29 MATERNAL CARE DUE TO UTERINE SCAR FROM OTHER PREVIOUS SURGERY: ICD-10-CM

## 2021-03-09 DIAGNOSIS — O26.899 OTHER SPECIFIED PREGNANCY RELATED CONDITIONS, UNSPECIFIED TRIMESTER: ICD-10-CM

## 2021-03-09 DIAGNOSIS — Z3A.00 WEEKS OF GESTATION OF PREGNANCY NOT SPECIFIED: ICD-10-CM

## 2021-03-09 DIAGNOSIS — O09.299 SUPERVISION OF PREGNANCY WITH OTHER POOR REPRODUCTIVE OR OBSTETRIC HISTORY, UNSPECIFIED TRIMESTER: Chronic | ICD-10-CM

## 2021-03-09 LAB
ALBUMIN SERPL ELPH-MCNC: 3.5 G/DL — SIGNIFICANT CHANGE UP (ref 3.3–5)
ALP SERPL-CCNC: 113 U/L — SIGNIFICANT CHANGE UP (ref 40–120)
ALT FLD-CCNC: 9 U/L — SIGNIFICANT CHANGE UP (ref 4–33)
ANION GAP SERPL CALC-SCNC: 11 MMOL/L — SIGNIFICANT CHANGE UP (ref 7–14)
APPEARANCE UR: ABNORMAL
APTT BLD: 29.3 SEC — SIGNIFICANT CHANGE UP (ref 27–36.3)
AST SERPL-CCNC: 11 U/L — SIGNIFICANT CHANGE UP (ref 4–32)
BACTERIA # UR AUTO: ABNORMAL
BASOPHILS # BLD AUTO: 0.02 K/UL — SIGNIFICANT CHANGE UP (ref 0–0.2)
BASOPHILS NFR BLD AUTO: 0.3 % — SIGNIFICANT CHANGE UP (ref 0–2)
BILIRUB SERPL-MCNC: <0.2 MG/DL — SIGNIFICANT CHANGE UP (ref 0.2–1.2)
BILIRUB UR-MCNC: NEGATIVE — SIGNIFICANT CHANGE UP
BLD GP AB SCN SERPL QL: NEGATIVE — SIGNIFICANT CHANGE UP
BUN SERPL-MCNC: 6 MG/DL — LOW (ref 7–23)
CALCIUM SERPL-MCNC: 9.7 MG/DL — SIGNIFICANT CHANGE UP (ref 8.4–10.5)
CHLORIDE SERPL-SCNC: 103 MMOL/L — SIGNIFICANT CHANGE UP (ref 98–107)
CO2 SERPL-SCNC: 22 MMOL/L — SIGNIFICANT CHANGE UP (ref 22–31)
COLOR SPEC: YELLOW — SIGNIFICANT CHANGE UP
CREAT ?TM UR-MCNC: 117 MG/DL — SIGNIFICANT CHANGE UP
CREAT SERPL-MCNC: 0.53 MG/DL — SIGNIFICANT CHANGE UP (ref 0.5–1.3)
DIFF PNL FLD: NEGATIVE — SIGNIFICANT CHANGE UP
EOSINOPHIL # BLD AUTO: 0.06 K/UL — SIGNIFICANT CHANGE UP (ref 0–0.5)
EOSINOPHIL NFR BLD AUTO: 0.8 % — SIGNIFICANT CHANGE UP (ref 0–6)
EPI CELLS # UR: 2 /HPF — SIGNIFICANT CHANGE UP (ref 0–5)
FIBRINOGEN PPP-MCNC: 738 MG/DL — HIGH (ref 290–520)
GLUCOSE SERPL-MCNC: 83 MG/DL — SIGNIFICANT CHANGE UP (ref 70–99)
GLUCOSE UR QL: NEGATIVE — SIGNIFICANT CHANGE UP
HCT VFR BLD CALC: 31.6 % — LOW (ref 34.5–45)
HGB BLD-MCNC: 9.7 G/DL — LOW (ref 11.5–15.5)
HYALINE CASTS # UR AUTO: 3 /LPF — SIGNIFICANT CHANGE UP (ref 0–7)
IANC: 5.58 K/UL — SIGNIFICANT CHANGE UP (ref 1.5–8.5)
IMM GRANULOCYTES NFR BLD AUTO: 0.8 % — SIGNIFICANT CHANGE UP (ref 0–1.5)
INR BLD: 1.09 RATIO — SIGNIFICANT CHANGE UP (ref 0.88–1.16)
KETONES UR-MCNC: NEGATIVE — SIGNIFICANT CHANGE UP
LDH SERPL L TO P-CCNC: 151 U/L — SIGNIFICANT CHANGE UP (ref 135–225)
LEUKOCYTE ESTERASE UR-ACNC: NEGATIVE — SIGNIFICANT CHANGE UP
LYMPHOCYTES # BLD AUTO: 0.8 K/UL — LOW (ref 1–3.3)
LYMPHOCYTES # BLD AUTO: 11.2 % — LOW (ref 13–44)
MCHC RBC-ENTMCNC: 24 PG — LOW (ref 27–34)
MCHC RBC-ENTMCNC: 30.7 GM/DL — LOW (ref 32–36)
MCV RBC AUTO: 78 FL — LOW (ref 80–100)
MONOCYTES # BLD AUTO: 0.61 K/UL — SIGNIFICANT CHANGE UP (ref 0–0.9)
MONOCYTES NFR BLD AUTO: 8.6 % — SIGNIFICANT CHANGE UP (ref 2–14)
NEUTROPHILS # BLD AUTO: 5.58 K/UL — SIGNIFICANT CHANGE UP (ref 1.8–7.4)
NEUTROPHILS NFR BLD AUTO: 78.3 % — HIGH (ref 43–77)
NITRITE UR-MCNC: NEGATIVE — SIGNIFICANT CHANGE UP
NRBC # BLD: 0 /100 WBCS — SIGNIFICANT CHANGE UP
NRBC # FLD: 0 K/UL — SIGNIFICANT CHANGE UP
PH UR: 7 — SIGNIFICANT CHANGE UP (ref 5–8)
PLATELET # BLD AUTO: 219 K/UL — SIGNIFICANT CHANGE UP (ref 150–400)
POTASSIUM SERPL-MCNC: 3.9 MMOL/L — SIGNIFICANT CHANGE UP (ref 3.5–5.3)
POTASSIUM SERPL-SCNC: 3.9 MMOL/L — SIGNIFICANT CHANGE UP (ref 3.5–5.3)
PROT ?TM UR-MCNC: 17 MG/DL — SIGNIFICANT CHANGE UP
PROT ?TM UR-MCNC: 17 MG/DL — SIGNIFICANT CHANGE UP
PROT SERPL-MCNC: 6.7 G/DL — SIGNIFICANT CHANGE UP (ref 6–8.3)
PROT UR-MCNC: ABNORMAL
PROT/CREAT UR-RTO: 0.1 RATIO — SIGNIFICANT CHANGE UP (ref 0–0.2)
PROTHROM AB SERPL-ACNC: 12.4 SEC — SIGNIFICANT CHANGE UP (ref 10.6–13.6)
RBC # BLD: 4.05 M/UL — SIGNIFICANT CHANGE UP (ref 3.8–5.2)
RBC # FLD: 17.6 % — HIGH (ref 10.3–14.5)
RBC CASTS # UR COMP ASSIST: 2 /HPF — SIGNIFICANT CHANGE UP (ref 0–4)
RH IG SCN BLD-IMP: POSITIVE — SIGNIFICANT CHANGE UP
SODIUM SERPL-SCNC: 136 MMOL/L — SIGNIFICANT CHANGE UP (ref 135–145)
SP GR SPEC: 1.02 — SIGNIFICANT CHANGE UP (ref 1.01–1.02)
URATE SERPL-MCNC: 4.2 MG/DL — SIGNIFICANT CHANGE UP (ref 2.5–7)
UROBILINOGEN FLD QL: SIGNIFICANT CHANGE UP
WBC # BLD: 7.13 K/UL — SIGNIFICANT CHANGE UP (ref 3.8–10.5)
WBC # FLD AUTO: 7.13 K/UL — SIGNIFICANT CHANGE UP (ref 3.8–10.5)
WBC UR QL: 5 /HPF — SIGNIFICANT CHANGE UP (ref 0–5)

## 2021-03-09 RX ORDER — ACETAMINOPHEN 500 MG
2 TABLET ORAL
Qty: 0 | Refills: 0 | DISCHARGE

## 2021-03-09 RX ORDER — SODIUM CHLORIDE 9 MG/ML
1000 INJECTION, SOLUTION INTRAVENOUS
Refills: 0 | Status: DISCONTINUED | OUTPATIENT
Start: 2021-03-15 | End: 2021-03-16

## 2021-03-09 RX ORDER — SODIUM CHLORIDE 9 MG/ML
3 INJECTION INTRAMUSCULAR; INTRAVENOUS; SUBCUTANEOUS EVERY 8 HOURS
Refills: 0 | Status: DISCONTINUED | OUTPATIENT
Start: 2021-03-09 | End: 2021-03-24

## 2021-03-09 RX ORDER — ASPIRIN/CALCIUM CARB/MAGNESIUM 324 MG
0 TABLET ORAL
Qty: 0 | Refills: 0 | DISCHARGE

## 2021-03-09 NOTE — OB PROVIDER TRIAGE NOTE - PMH
Anemia    Chronic hypertension  no current meds, denies at PST  COVID-19   symptomatic: lost taste and smell  Incompetence of cervix uteri    Intramural leiomyoma of uterus    Menorrhagia    Missed     Obesity (BMI 30-39.9)

## 2021-03-09 NOTE — OB PROVIDER TRIAGE NOTE - NSOBPROVIDERNOTE_OBGYN_ALL_OB_FT
35 year old female  at 36.2 weeks  labile BP at office   BP normotensive in triage  no PEC s/s   Hellp labs normal       case d/w Dr Devlin     discharge home with instructions     office BP check this week     scheduled CS 3/15 for hx of myomectomy     Joe [facility]

## 2021-03-09 NOTE — OB PROVIDER TRIAGE NOTE - NSHPPHYSICALEXAM_GEN_ALL_CORE
pt seen and examined     ICU Vital Signs Last 24 Hrs  T(C): 36.9 (09 Mar 2021 13:16), Max: 36.9 (09 Mar 2021 13:16)  T(F): 98.4 (09 Mar 2021 13:16), Max: 98.4 (09 Mar 2021 13:16)  HR: 88 (09 Mar 2021 15:11) (80 - 89)  BP: 141/81 (09 Mar 2021 15:11) (126/77 - 165/83)  pt in NAD   lungs  clear   heart S 1 S2   abd soft  gravid non tender   abd scan   vertex aafi  bpp 8/8     cat 1 tracing  no contrx pattern

## 2021-03-09 NOTE — OB PROVIDER TRIAGE NOTE - HISTORY OF PRESENT ILLNESS
EMTALA /85 KAT MISHRA 135/85   35 year old female  at 36.2 weeks gestation who presents from office with elevated BP  KAT /85   35 year old female  at 36.2 weeks gestation who presents from office with elevated /80 and intermittent HA which has resolved     denied any other s/s of PEC  and stated is for scheduled RCS on 3/15 for hx of myomectomy 2016    denied any rom lof vb  feels gfm   denied any contractions  feels gfm    had cerclage placed  placed at 14 weeks   ( hx of 18 wks loss )       PNC  Dr Devlin    med hx denied    Surghx  myomectomy 2016     cerclage  10/2020       OB hx 2019 18 w SAB  came in dilated  VD    gyn hx denied    NKDA    will accept blood transfusion   63in  250#

## 2021-03-09 NOTE — OB PROVIDER TRIAGE NOTE - FINDINGS/TREATMENT
normotensive BP in triage    discharge home with instructions     continue BP monitoring     scheduled CS  3/15

## 2021-03-09 NOTE — OB RN TRIAGE NOTE - CHIEF COMPLAINT QUOTE
sent from MD office with elevated BP, headache, leg swelling, proteinuria  *scheduled c/s 3/15* sent from MD office with elevated BP, headache, feet swelling, proteinuria (took Tylenol @630a)  *scheduled c/s 3/15*

## 2021-03-09 NOTE — OB RN TRIAGE NOTE - PMH
Anemia    Chronic hypertension  no current meds, denies at PST  Incompetence of cervix uteri    Intramural leiomyoma of uterus    Menorrhagia    Missed     Obesity (BMI 30-39.9)     Anemia    Chronic hypertension  no current meds, denies at PST  COVID-19   symptomatic: lost taste and smell  Incompetence of cervix uteri    Intramural leiomyoma of uterus    Menorrhagia    Missed     Obesity (BMI 30-39.9)

## 2021-03-09 NOTE — OB PST NOTE - HISTORY OF PRESENT ILLNESS
34 y/o female with h/o Anemia , 36.2 weeks, pregnant, LMP, 2020, [facility]UD[facility] 2021  Pt denies abnormal vaginal bleeding or leaking  Pt reports + FM during PAST 36 y/o female with h/o Anemia, miscarriage, 36.2 weeks pregnant, , LMP 2020, [facility] 2021 presents to PST for pre op evaluation in preparation for Primary  section, cerclage removal on 03/15/2021 as per Dr. Devlin.  Pt denies abnormal vaginal bleeding or leaking  Pt reports + FM during PAST    Pt was seen at [facility] ED today secondary to c/o headache. Pt was evaluated and discharged

## 2021-03-09 NOTE — OB PST NOTE - PROBLEM SELECTOR PLAN 1
Scheduled for primary  section, cerclage removal on 03/15/2021. Pre op instructions, chlorhexidine gluconate soap given and explained. Pt verbalized understanding.   Pt confirmed scheduled appt. for Covid test pre op

## 2021-03-09 NOTE — OB PST NOTE - PSH
H/O myomectomy  abdominal-2016. pt received blood transfusion prior to surgery due to low hemoglobin  S/P dilatation and curettage  3/2019 after misscarriage   H/O myomectomy  abdominal-2016. pt received blood transfusion prior to surgery due to low hemoglobin  History of cervical cerclage, currently pregnant    S/P dilatation and curettage  3/2019 after misscarriage

## 2021-03-09 NOTE — OB RN TRIAGE NOTE - CURRENT PREGNANCY COMPLICATIONS, OB PROFILE
Incompetent Cervix/Cervical Insufficiency/Other proteinuria/Incompetent Cervix/Cervical Insufficiency/Other

## 2021-03-12 ENCOUNTER — APPOINTMENT (OUTPATIENT)
Dept: DISASTER EMERGENCY | Facility: CLINIC | Age: 36
End: 2021-03-12

## 2021-03-13 LAB — SARS-COV-2 N GENE NPH QL NAA+PROBE: NOT DETECTED

## 2021-03-14 ENCOUNTER — TRANSCRIPTION ENCOUNTER (OUTPATIENT)
Age: 36
End: 2021-03-14

## 2021-03-15 ENCOUNTER — INPATIENT (INPATIENT)
Facility: HOSPITAL | Age: 36
LOS: 1 days | Discharge: HOME | End: 2021-03-17
Attending: OBSTETRICS & GYNECOLOGY | Admitting: OBSTETRICS & GYNECOLOGY
Payer: COMMERCIAL

## 2021-03-15 ENCOUNTER — TRANSCRIPTION ENCOUNTER (OUTPATIENT)
Age: 36
End: 2021-03-15

## 2021-03-15 VITALS — HEART RATE: 97 BPM | SYSTOLIC BLOOD PRESSURE: 132 MMHG | TEMPERATURE: 98 F | DIASTOLIC BLOOD PRESSURE: 84 MMHG

## 2021-03-15 DIAGNOSIS — Z98.890 OTHER SPECIFIED POSTPROCEDURAL STATES: Chronic | ICD-10-CM

## 2021-03-15 DIAGNOSIS — O09.299 SUPERVISION OF PREGNANCY WITH OTHER POOR REPRODUCTIVE OR OBSTETRIC HISTORY, UNSPECIFIED TRIMESTER: Chronic | ICD-10-CM

## 2021-03-15 DIAGNOSIS — Z98.890 OTHER SPECIFIED POSTPROCEDURAL STATES: ICD-10-CM

## 2021-03-15 DIAGNOSIS — O34.29 MATERNAL CARE DUE TO UTERINE SCAR FROM OTHER PREVIOUS SURGERY: ICD-10-CM

## 2021-03-15 DIAGNOSIS — N88.3 INCOMPETENCE OF CERVIX UTERI: ICD-10-CM

## 2021-03-15 LAB
ALBUMIN SERPL ELPH-MCNC: 3.5 G/DL — SIGNIFICANT CHANGE UP (ref 3.3–5)
ALP SERPL-CCNC: 111 U/L — SIGNIFICANT CHANGE UP (ref 40–120)
ALT FLD-CCNC: 9 U/L — SIGNIFICANT CHANGE UP (ref 4–33)
ANION GAP SERPL CALC-SCNC: 12 MMOL/L — SIGNIFICANT CHANGE UP (ref 7–14)
APTT BLD: 30 SEC — SIGNIFICANT CHANGE UP (ref 27–36.3)
AST SERPL-CCNC: 11 U/L — SIGNIFICANT CHANGE UP (ref 4–32)
BILIRUB SERPL-MCNC: 0.2 MG/DL — SIGNIFICANT CHANGE UP (ref 0.2–1.2)
BLD GP AB SCN SERPL QL: NEGATIVE — SIGNIFICANT CHANGE UP
BUN SERPL-MCNC: 5 MG/DL — LOW (ref 7–23)
CALCIUM SERPL-MCNC: 9.3 MG/DL — SIGNIFICANT CHANGE UP (ref 8.4–10.5)
CHLORIDE SERPL-SCNC: 103 MMOL/L — SIGNIFICANT CHANGE UP (ref 98–107)
CO2 SERPL-SCNC: 21 MMOL/L — LOW (ref 22–31)
CREAT SERPL-MCNC: 0.49 MG/DL — LOW (ref 0.5–1.3)
FIBRINOGEN PPP-MCNC: 641 MG/DL — HIGH (ref 290–520)
GLUCOSE SERPL-MCNC: 80 MG/DL — SIGNIFICANT CHANGE UP (ref 70–99)
HCT VFR BLD CALC: 30.5 % — LOW (ref 34.5–45)
HCT VFR BLD CALC: 33.1 % — LOW (ref 34.5–45)
HGB BLD-MCNC: 9.3 G/DL — LOW (ref 11.5–15.5)
HGB BLD-MCNC: 9.9 G/DL — LOW (ref 11.5–15.5)
INR BLD: 1.11 RATIO — SIGNIFICANT CHANGE UP (ref 0.88–1.16)
LDH SERPL L TO P-CCNC: 162 U/L — SIGNIFICANT CHANGE UP (ref 135–225)
MCHC RBC-ENTMCNC: 23.4 PG — LOW (ref 27–34)
MCHC RBC-ENTMCNC: 23.8 PG — LOW (ref 27–34)
MCHC RBC-ENTMCNC: 29.9 GM/DL — LOW (ref 32–36)
MCHC RBC-ENTMCNC: 30.5 GM/DL — LOW (ref 32–36)
MCV RBC AUTO: 78 FL — LOW (ref 80–100)
MCV RBC AUTO: 78.3 FL — LOW (ref 80–100)
NRBC # BLD: 0 /100 WBCS — SIGNIFICANT CHANGE UP
NRBC # BLD: 0 /100 WBCS — SIGNIFICANT CHANGE UP
NRBC # FLD: 0 K/UL — SIGNIFICANT CHANGE UP
NRBC # FLD: 0 K/UL — SIGNIFICANT CHANGE UP
PLATELET # BLD AUTO: 211 K/UL — SIGNIFICANT CHANGE UP (ref 150–400)
PLATELET # BLD AUTO: 220 K/UL — SIGNIFICANT CHANGE UP (ref 150–400)
POTASSIUM SERPL-MCNC: 3.6 MMOL/L — SIGNIFICANT CHANGE UP (ref 3.5–5.3)
POTASSIUM SERPL-SCNC: 3.6 MMOL/L — SIGNIFICANT CHANGE UP (ref 3.5–5.3)
PROT SERPL-MCNC: 6.5 G/DL — SIGNIFICANT CHANGE UP (ref 6–8.3)
PROTHROM AB SERPL-ACNC: 12.7 SEC — SIGNIFICANT CHANGE UP (ref 10.6–13.6)
RBC # BLD: 3.91 M/UL — SIGNIFICANT CHANGE UP (ref 3.8–5.2)
RBC # BLD: 4.23 M/UL — SIGNIFICANT CHANGE UP (ref 3.8–5.2)
RBC # FLD: 17.2 % — HIGH (ref 10.3–14.5)
RBC # FLD: 17.3 % — HIGH (ref 10.3–14.5)
RH IG SCN BLD-IMP: POSITIVE — SIGNIFICANT CHANGE UP
SARS-COV-2 IGG SERPL QL IA: POSITIVE
SARS-COV-2 IGM SERPL IA-ACNC: 28.9 INDEX — HIGH
SODIUM SERPL-SCNC: 136 MMOL/L — SIGNIFICANT CHANGE UP (ref 135–145)
T PALLIDUM AB TITR SER: NEGATIVE — SIGNIFICANT CHANGE UP
URATE SERPL-MCNC: 3.9 MG/DL — SIGNIFICANT CHANGE UP (ref 2.5–7)
WBC # BLD: 6.7 K/UL — SIGNIFICANT CHANGE UP (ref 3.8–10.5)
WBC # BLD: 8.18 K/UL — SIGNIFICANT CHANGE UP (ref 3.8–10.5)
WBC # FLD AUTO: 6.7 K/UL — SIGNIFICANT CHANGE UP (ref 3.8–10.5)
WBC # FLD AUTO: 8.18 K/UL — SIGNIFICANT CHANGE UP (ref 3.8–10.5)

## 2021-03-15 PROCEDURE — 59320 REVISION OF CERVIX: CPT | Mod: AS

## 2021-03-15 PROCEDURE — 59025 FETAL NON-STRESS TEST: CPT | Mod: 26,AS

## 2021-03-15 RX ORDER — ONDANSETRON 8 MG/1
4 TABLET, FILM COATED ORAL EVERY 6 HOURS
Refills: 0 | Status: DISCONTINUED | OUTPATIENT
Start: 2021-03-15 | End: 2021-03-16

## 2021-03-15 RX ORDER — FAMOTIDINE 10 MG/ML
20 INJECTION INTRAVENOUS ONCE
Refills: 0 | Status: COMPLETED | OUTPATIENT
Start: 2021-03-15 | End: 2021-03-15

## 2021-03-15 RX ORDER — SODIUM CHLORIDE 9 MG/ML
1000 INJECTION, SOLUTION INTRAVENOUS ONCE
Refills: 0 | Status: COMPLETED | OUTPATIENT
Start: 2021-03-15 | End: 2021-03-15

## 2021-03-15 RX ORDER — NALOXONE HYDROCHLORIDE 4 MG/.1ML
0.1 SPRAY NASAL
Refills: 0 | Status: DISCONTINUED | OUTPATIENT
Start: 2021-03-15 | End: 2021-03-16

## 2021-03-15 RX ORDER — FERROUS SULFATE 325(65) MG
1 TABLET ORAL
Qty: 0 | Refills: 0 | DISCHARGE

## 2021-03-15 RX ORDER — ACETAMINOPHEN 500 MG
975 TABLET ORAL
Refills: 0 | Status: DISCONTINUED | OUTPATIENT
Start: 2021-03-15 | End: 2021-03-17

## 2021-03-15 RX ORDER — METOCLOPRAMIDE HCL 10 MG
10 TABLET ORAL ONCE
Refills: 0 | Status: COMPLETED | OUTPATIENT
Start: 2021-03-15 | End: 2021-03-15

## 2021-03-15 RX ORDER — HEPARIN SODIUM 5000 [USP'U]/ML
10000 INJECTION INTRAVENOUS; SUBCUTANEOUS EVERY 12 HOURS
Refills: 0 | Status: DISCONTINUED | OUTPATIENT
Start: 2021-03-15 | End: 2021-03-17

## 2021-03-15 RX ORDER — OXYTOCIN 10 UNIT/ML
333.33 VIAL (ML) INJECTION
Qty: 20 | Refills: 0 | Status: COMPLETED | OUTPATIENT
Start: 2021-03-15 | End: 2021-03-15

## 2021-03-15 RX ORDER — SODIUM CHLORIDE 9 MG/ML
1000 INJECTION, SOLUTION INTRAVENOUS
Refills: 0 | Status: DISCONTINUED | OUTPATIENT
Start: 2021-03-15 | End: 2021-03-16

## 2021-03-15 RX ORDER — LANOLIN
1 OINTMENT (GRAM) TOPICAL EVERY 6 HOURS
Refills: 0 | Status: DISCONTINUED | OUTPATIENT
Start: 2021-03-15 | End: 2021-03-17

## 2021-03-15 RX ORDER — TETANUS TOXOID, REDUCED DIPHTHERIA TOXOID AND ACELLULAR PERTUSSIS VACCINE, ADSORBED 5; 2.5; 8; 8; 2.5 [IU]/.5ML; [IU]/.5ML; UG/.5ML; UG/.5ML; UG/.5ML
0.5 SUSPENSION INTRAMUSCULAR ONCE
Refills: 0 | Status: DISCONTINUED | OUTPATIENT
Start: 2021-03-15 | End: 2021-03-17

## 2021-03-15 RX ORDER — OXYCODONE HYDROCHLORIDE 5 MG/1
10 TABLET ORAL
Refills: 0 | Status: DISCONTINUED | OUTPATIENT
Start: 2021-03-15 | End: 2021-03-16

## 2021-03-15 RX ORDER — DIPHENHYDRAMINE HCL 50 MG
25 CAPSULE ORAL EVERY 6 HOURS
Refills: 0 | Status: DISCONTINUED | OUTPATIENT
Start: 2021-03-15 | End: 2021-03-17

## 2021-03-15 RX ORDER — SIMETHICONE 80 MG/1
80 TABLET, CHEWABLE ORAL EVERY 4 HOURS
Refills: 0 | Status: DISCONTINUED | OUTPATIENT
Start: 2021-03-15 | End: 2021-03-17

## 2021-03-15 RX ORDER — IBUPROFEN 200 MG
1 TABLET ORAL
Qty: 0 | Refills: 0 | DISCHARGE
Start: 2021-03-15

## 2021-03-15 RX ORDER — NALBUPHINE HYDROCHLORIDE 10 MG/ML
2.5 INJECTION, SOLUTION INTRAMUSCULAR; INTRAVENOUS; SUBCUTANEOUS EVERY 6 HOURS
Refills: 0 | Status: DISCONTINUED | OUTPATIENT
Start: 2021-03-15 | End: 2021-03-16

## 2021-03-15 RX ORDER — CHOLECALCIFEROL (VITAMIN D3) 125 MCG
1 CAPSULE ORAL
Qty: 0 | Refills: 0 | DISCHARGE

## 2021-03-15 RX ORDER — OXYCODONE HYDROCHLORIDE 5 MG/1
5 TABLET ORAL
Refills: 0 | Status: DISCONTINUED | OUTPATIENT
Start: 2021-03-15 | End: 2021-03-16

## 2021-03-15 RX ORDER — ACETAMINOPHEN 500 MG
3 TABLET ORAL
Qty: 0 | Refills: 0 | DISCHARGE
Start: 2021-03-15

## 2021-03-15 RX ORDER — MAGNESIUM HYDROXIDE 400 MG/1
30 TABLET, CHEWABLE ORAL
Refills: 0 | Status: DISCONTINUED | OUTPATIENT
Start: 2021-03-15 | End: 2021-03-17

## 2021-03-15 RX ORDER — HYDROXYPROGESTERONE CAPROATE 250 MG/ML
0 VIAL (ML) INTRAMUSCULAR
Qty: 0 | Refills: 0 | DISCHARGE

## 2021-03-15 RX ORDER — OXYCODONE HYDROCHLORIDE 5 MG/1
5 TABLET ORAL
Refills: 0 | Status: COMPLETED | OUTPATIENT
Start: 2021-03-15 | End: 2021-03-22

## 2021-03-15 RX ORDER — FERROUS SULFATE 325(65) MG
325 TABLET ORAL DAILY
Refills: 0 | Status: DISCONTINUED | OUTPATIENT
Start: 2021-03-15 | End: 2021-03-17

## 2021-03-15 RX ORDER — ASPIRIN/CALCIUM CARB/MAGNESIUM 324 MG
1 TABLET ORAL
Qty: 0 | Refills: 0 | DISCHARGE

## 2021-03-15 RX ORDER — IBUPROFEN 200 MG
600 TABLET ORAL EVERY 6 HOURS
Refills: 0 | Status: COMPLETED | OUTPATIENT
Start: 2021-03-15 | End: 2022-02-11

## 2021-03-15 RX ORDER — KETOROLAC TROMETHAMINE 30 MG/ML
30 SYRINGE (ML) INJECTION EVERY 6 HOURS
Refills: 0 | Status: DISCONTINUED | OUTPATIENT
Start: 2021-03-15 | End: 2021-03-16

## 2021-03-15 RX ORDER — OXYTOCIN 10 UNIT/ML
333.33 VIAL (ML) INJECTION
Qty: 20 | Refills: 0 | Status: DISCONTINUED | OUTPATIENT
Start: 2021-03-15 | End: 2021-03-16

## 2021-03-15 RX ORDER — DEXAMETHASONE 0.5 MG/5ML
4 ELIXIR ORAL EVERY 6 HOURS
Refills: 0 | Status: DISCONTINUED | OUTPATIENT
Start: 2021-03-15 | End: 2021-03-16

## 2021-03-15 RX ORDER — OXYCODONE HYDROCHLORIDE 5 MG/1
5 TABLET ORAL ONCE
Refills: 0 | Status: DISCONTINUED | OUTPATIENT
Start: 2021-03-15 | End: 2021-03-17

## 2021-03-15 RX ORDER — DOCUSATE SODIUM 100 MG
1 CAPSULE ORAL
Qty: 0 | Refills: 0 | DISCHARGE

## 2021-03-15 RX ORDER — CITRIC ACID/SODIUM CITRATE 300-500 MG
30 SOLUTION, ORAL ORAL ONCE
Refills: 0 | Status: COMPLETED | OUTPATIENT
Start: 2021-03-15 | End: 2021-03-15

## 2021-03-15 RX ADMIN — SODIUM CHLORIDE 125 MILLILITER(S): 9 INJECTION, SOLUTION INTRAVENOUS at 06:47

## 2021-03-15 RX ADMIN — SODIUM CHLORIDE 2000 MILLILITER(S): 9 INJECTION, SOLUTION INTRAVENOUS at 06:15

## 2021-03-15 RX ADMIN — SODIUM CHLORIDE 75 MILLILITER(S): 9 INJECTION, SOLUTION INTRAVENOUS at 13:31

## 2021-03-15 RX ADMIN — Medication 1000 MILLIUNIT(S)/MIN: at 13:31

## 2021-03-15 RX ADMIN — Medication 30 MILLIGRAM(S): at 18:24

## 2021-03-15 RX ADMIN — Medication 10 MILLIGRAM(S): at 06:46

## 2021-03-15 RX ADMIN — HEPARIN SODIUM 10000 UNIT(S): 5000 INJECTION INTRAVENOUS; SUBCUTANEOUS at 18:24

## 2021-03-15 RX ADMIN — FAMOTIDINE 20 MILLIGRAM(S): 10 INJECTION INTRAVENOUS at 06:46

## 2021-03-15 RX ADMIN — Medication 30 MILLILITER(S): at 07:26

## 2021-03-15 NOTE — OB PROVIDER H&P - PSH
H/O myomectomy  abdominal-2016. pt received blood transfusion prior to surgery due to low hemoglobin  History of cervical cerclage, currently pregnant    S/P dilatation and curettage  3/2019 after misscarriage

## 2021-03-15 NOTE — BRIEF OPERATIVE NOTE - NSICDXBRIEFPROCEDURE_GEN_ALL_CORE_FT
PROCEDURES:  Lysis, adhesions, intestine 15-Mar-2021 10:45:32  Graciela Louis  Primary  section 15-Mar-2021 10:44:43  Graciela Louis   PROCEDURES:   section, repeat, with removal of cerclage from cervix 15-Mar-2021 14:02:00  Graciela Louis  Lysis, adhesions, intestine 15-Mar-2021 10:45:32  Graciela Louis  Primary  section 15-Mar-2021 10:44:43  Graciela Louis

## 2021-03-15 NOTE — BRIEF OPERATIVE NOTE - OPERATION/FINDINGS
Viable male infant, apgar 8/8, wgt 6.15#, 3160gms  delivered @08:56  cephalic presentation, clear copious fluid  hysterotomy closed in single layer /2 layers  bowel adhesion lysed w/metzenbaum scissors and tied, anterior surface of uterus  Elyssa, Fibrillar placed over hysterotomy, rectus layer  2-small pedunculated fibroid slightly anterior to hysterotomy  otherwise grossly nl uterus, tubes & ovaries    QBL: 874  UOP: 150  IVF: 4200 Viable male infant, apgar 8/8, wgt 6.15#, 3160gms  delivered @08:56  cephalic presentation, clear copious fluid  hysterotomy closed in single layer    anterior surface of uterus noted bowel adhesion cut w/metzenbaum scissors and tied,  Elyssa, Fibrillar placed over hysterotomy, rectus layer  2-small, < 5cm in size. pedunculated fibroid slightly superior to hysterotomy  otherwise grossly nl uterus, tubes & ovaries  Pt was placed in lithotomy position ,   2- Mersaline suture removed from cervix- Engel cerclage    QBL: 874  UOP: 150  IVF: 4200  Dictation Number: [id number]

## 2021-03-15 NOTE — BRIEF OPERATIVE NOTE - NSICDXBRIEFPOSTOP_GEN_ALL_CORE_FT
POST-OP DIAGNOSIS:  S/P  section 15-Mar-2021 10:46:12  Graciela Louis   POST-OP DIAGNOSIS:  Intestinal adhesions 15-Mar-2021 14:03:57  Graciela Louis  History of cervical cerclage 15-Mar-2021 14:03:28  Graciela Louis  S/P  section 15-Mar-2021 10:46:12  Graciela Louis

## 2021-03-15 NOTE — OB RN PATIENT PROFILE - NSTRANFUSIONOBJECTION_GEN_ALL_CORE_SIUH
INITIAL OT ASSESSMENT     08/20/18 1500   Clinical Impression   Affect Appropriate to situation   Orientation Oriented to person, place and time   Appearance and ADLs General cleanliness observed in most areas   Attention to Internal Stimuli No observed signs   Interaction Skills Interacts appropriately with staff;Initiates appropriately with staff;Interacts appropriately with peers;Initiates appropriately with peers   Ability to Communicate Needs Independent   Verbal Content Clear;Articulate;Appropriate to topic   Ability to Maintain Boundaries Maintains appropriate physical boundaries;Maintains appropriate verbal boundaries   Participation Initiates participation;Independently participates   Concentration Concentrates 50 minutes   Ability to Concentrate With structure   Follows and Comprehends Directions Independently follows multi-step directions   Memory Delayed and immediate recall intact   Organization Independently organizes all tasks   Decision Making Independent   Planning and Problem Solving Occasionally needs assist/feedback   Ability to Apply and Learn Concepts Applies within group structure   Frustrations / Stress Tolerance Independently identifies skills    Level of Insight Identifies needs with structure/support   Self Esteem Can identify positives;Takes risks with support and encouragement;Accepts positive feedback   Social Supports Has knowledge of support systems      Patient has no objection to blood transfusions.

## 2021-03-15 NOTE — OB PROVIDER H&P - ASSESSMENT
Yes Assessment  Pt is a 35y  @37.1wks being admitted for pLTCS 2/2 h/o abdominal myomectomy ().    Plan  1. Admit to LND. Routine Labs. IVF.  2. pre-op orders in  3. Fetus: cat 1 tracing. VTX. EFW 2800g by sono. Continuous EFM. Sono. No concerns.  4. Prenatal issues: none  5. Covid negative    Aranza Ramesh,PGY1     Admit to [facility] MD-Service  Dx: scheduled pLTCS/cerclage removal  Dx: cervical insufficiency  NPO  routine labs  EFM/TOCO  anesthesia consult  Graciela Vázquez PAC, C-EFM  03-15-21 @ 07:05     Admit to [facility] MD-Service  Dx: scheduled pLTCS/cerclage removal  Dx: cervical insufficiency  NPO  routine labs  T&C-2u  EFM/TOCO  anesthesia consult  Graciela Vázquez PAC, C-EFM  03-15-21 @ 07:05

## 2021-03-15 NOTE — DISCHARGE NOTE OB - CHANGE SANITARY PADS FREQUENTLY.  WASHING AND WIPING SHOULD OCCUR FROM FRONT TO BACK
Statement Selected
Ambulatory pt in no acute distress c/o sensation of food stuck in her throat after eating shrimp appx 1 hour prior to arrival.  Pain is on left side of neck.  No respiratory distress, no drooling, able to swallow, no n/v.

## 2021-03-15 NOTE — BRIEF OPERATIVE NOTE - NSICDXBRIEFPREOP_GEN_ALL_CORE_FT
PRE-OP DIAGNOSIS:  H/O myomectomy 15-Mar-2021 10:45:55  Graciela Louis   PRE-OP DIAGNOSIS:  Engel cerclage present 15-Mar-2021 14:02:29  Graciela Louis  H/O myomectomy 15-Mar-2021 10:45:55  Graciela Louis

## 2021-03-15 NOTE — DISCHARGE NOTE OB - CARE PLAN
Goal:	return to usual activities  Assessment and plan of treatment:	nothing per vagina x 6 weeks, follow-up for incision check in 2 weeks, full post partum visit in 6 weeks.   Principal Discharge DX:	 delivery delivered  Goal:	return to usual activities  Assessment and plan of treatment:	nothing per vagina x 6 weeks, follow-up for incision check in 2 weeks, full post partum visit in 6 weeks.

## 2021-03-15 NOTE — OB NEONATOLOGY/PEDIATRICIAN DELIVERY SUMMARY - NSPEDSNEONOTESA_OBGYN_ALL_OB_FT
37.1 week male born to a 36y/o  mom via CS for hx of myomectomy.   Maternal Hx significant for: none  Ob Hx: cerclage placed in 10/2020  No ROM or labor prior to CS.   Maternal labs: Blood type A+ and COVID negative. GBS negative. PNL negative/NR/immune.      Delivery of baby was uncomplicated and baby had spontaneous cry on abdomen. Resuscitation by OB RN included drying, bulb suctioning, deep suctioning and stimulation. Peds called at 8 minutes of life for desaturation. LDR nurse had started CPAP at that time and peds arrived at 10 minutes of life. CPAP5 increased to 6 and max FiO2 30%. Baby received CPAP for a total of ~12 minutes. At approximately 25 minutes of life, baby was maintaining O2 saturation on RA and had mild belly breathing. Discussed with OB RN to attempt skin to skin with dad. If baby had worsening work of breathing, she would call peds to re-evaluate. Discussed plan with mom and dad.   Apgars 8 and 9.   No abnormalities on physical exam.   EOS not required.    Does want hepatitis B vaccine.   Peds is Jose.

## 2021-03-15 NOTE — OB PROVIDER H&P - NS_OBGYNHISTORY_OBGYN_ALL_OB_FT
OBHx:             2019-Rinku@18wks    GYN-         2016- Myomectomy- Laparotomy OBHx:             2019-Rinku@18wks    GYN-        2016- Myomectomy- Laparotomy       2015- Menorrhagia- Bld Transfusion x 1u- no rxn

## 2021-03-15 NOTE — OB PROVIDER H&P - PMH
Anemia    Chronic hypertension  no current meds, denies at PST  COVID-19   symptomatic: lost taste and smell  Incompetence of cervix uteri    Intramural leiomyoma of uterus    Menorrhagia    Missed     Obesity (BMI 30-39.9)     Anemia    Chronic hypertension  no current meds, denies at PST  COVID-19   symptomatic: lost taste and smell  Incompetence of cervix uteri    Intramural leiomyoma of uterus    Menorrhagia  w/blood transfusion x 1 u  Missed     Obesity (BMI 30-39.9)

## 2021-03-15 NOTE — OB RN PATIENT PROFILE - NS PRO RUBELLA CONTRAINDICATIONS OB
58 yo BM brought in by [employer] who while being arrested stated he was on the way to the hospital for progressive neck and arm pain. Patient was previously admitted early June, 2018 after recurrent falls and diagnosed with cervical myelopathy due to stenosis and degenerative disc disease. Patient with noted myelomalacia of cervical spinal cord. Patient alleges that he has progressively worsening and had another fall few days ago but not seek any medical attention until he was arrested today. Patient c/o neck pain and R arm pain with hyperesthesia. No change in bowel/bladder function.    IMP:  cervical myelopathy without evidence of acute changes  Marked cervical stenosis with (+) signal change in cord.    Plan:  No indication for emergent admission  Recommend Medrol dose pack  Recommend Neurontin 300 mg TID    Follow up office unless acute change in Neurological status. pregnant

## 2021-03-15 NOTE — DISCHARGE NOTE OB - MEDICATION SUMMARY - MEDICATIONS TO TAKE
I will START or STAY ON the medications listed below when I get home from the hospital:    acetaminophen 325 mg oral tablet  -- 3 tab(s) by mouth   -- Indication: For pain    ibuprofen 600 mg oral tablet  -- 1 tab(s) by mouth every 6 hours  -- Indication: For pain    Prenatal Multivitamins oral tablet  -- 1 tab(s) by mouth once a day. Last dose 3/9/21  -- Indication: For supplement   I will START or STAY ON the medications listed below when I get home from the hospital:    ibuprofen 600 mg oral tablet  -- 1 tab(s) by mouth every 6 hours, As Needed  -- Indication: For pain    acetaminophen 325 mg oral tablet  -- 3 tab(s) by mouth , As Needed  -- Indication: For pain    Prenatal Multivitamins oral tablet  -- 1 tab(s) by mouth once a day. Last dose 3/9/21  -- Indication: For supplement

## 2021-03-15 NOTE — OB PROVIDER H&P - CURRENT PREGNANCY COMPLICATIONS, OB PROFILE
prior myomectomy/Maternal Medical Condition prior myomectomy/Incompetent Cervix/Cervical Insufficiency/Maternal Medical Condition/Other

## 2021-03-15 NOTE — DISCHARGE NOTE OB - PATIENT PORTAL LINK FT
You can access the FollowMyHealth Patient Portal offered by [facility] by registering at the following website: [email or web address]. By joining [facility]’s FollowMyHealth portal, you will also be able to view your health information using other applications (apps) compatible with our system.

## 2021-03-15 NOTE — OB PROVIDER H&P - NSHPPHYSICALEXAM_GEN_ALL_CORE
CV: RRR  Pulm: breathing comfortably on RA  Abd: gravid, nontender  Extr: moving all extremities with ease  FHT: baseline 140s, mod variability, +accels, -decels  Excello: none  EFW: 2800g  Sono: vertex T(C): 36.9 (03-15-21 @ 06:30), Max: 36.9 (03-15-21 @ 05:54)  HR: 97 (03-15-21 @ 06:30) (97 - 97)  BP: 132/84 (03-15-21 @ 06:30) (132/84 - 132/84)  RR: 17 (03-15-21 @ 06:30) (17 - 17)  SpO2: --Height (cm): 160 (03-15-21 @ 06:30)  Weight (kg): 104.3 (03-15-21 @ 06:30)  BMI (kg/m2): 40.7 (03-15-21 @ 06:30)  BSA (m2): 2.05 (03-15-21 @ 06:30)    A&Ox3  Heart: RRR, S1&S2, no S3  Lungs: Clear bilateral to auscultation, good inspiratory /expiratory effort              no rhonchi, no rales  Abd: soft, Gravid, TOCO in place           +pfannenstial scar    EFM:  xxxxx bpm/moderate variabilty/+accelerations/no decelerations/CAT 1  TOCO:  no UC  Ext:  FROM /bilateral  1+ LE edema   Neuro: grossly intact T(C): 36.9 (03-15-21 @ 06:30), Max: 36.9 (03-15-21 @ 05:54)  HR: 97 (03-15-21 @ 06:30) (97 - 97)  BP: 132/84 (03-15-21 @ 06:30) (132/84 - 132/84)  RR: 17 (03-15-21 @ 06:30) (17 - 17)  SpO2: --Height (cm): 160 (03-15-21 @ 06:30)  Weight (kg): 104.3 (03-15-21 @ 06:30)  BMI (kg/m2): 40.7 (03-15-21 @ 06:30)  BSA (m2): 2.05 (03-15-21 @ 06:30)    A&Ox3  Heart: RRR, S1&S2, no S3  Lungs: Clear bilateral to auscultation, good inspiratory /expiratory effort              no rhonchi, no rales  Abd: soft, Gravid, TOCO in place           +pfannenstial scar    EFM:  130 bpm/moderate variabilty/+accelerations/no decelerations/CAT 1  TOCO:  no UC  Ext:  FROM /bilateral  1+ LE edema   Neuro: grossly intact

## 2021-03-15 NOTE — OB RN INTRAOPERATIVE NOTE - NS_IMPLANTS_OBGYN_ALL_OB
Surgicel Hemostat Fibrillar Elyssa/Surgicel Hemostat Fibrillar Elyssa/Surgicel Hemostat Fibrillar/Other

## 2021-03-15 NOTE — DISCHARGE NOTE OB - MATERIALS PROVIDED
Immunization Record/Breastfeeding Log/Bottle Feeding Log/Guide to Postpartum Care/Shaken Baby Prevention Handout/Breastfeeding Guide and Packet

## 2021-03-15 NOTE — OB PROVIDER H&P - HISTORY OF PRESENT ILLNESS
35y yo  @37.1wks presents for scheduled pLTCS 2/2 h/o prior myomectomy. +FM. -LOF. -CTXs. -VB. Pt denies any other concerns.    – PNC: Denies prenatal issues.  EFW 2800g.  – OBHx: MAB 2019 (s/p D&C)  – GynHx: h/o abdominal myomectomy  (5 fibroids removed), currently has small fibroids throughout uterus, denies h/o STIs, abnormal cervical exams  – PMH: anemia w/ h/o blood transfusions  – PSH: abdominal myomectomy 2016, D&C 2019  – Psych: denies h/o anxiety/depression  – Social: denies h/o toxic habits in pregnancy  – Meds: PNV, iron, baby aspirin, vit d, docusate   – Allergies: NKDA 36yo female   EDC 21  presents@37.1 wks for scheduled pltcs due to h/o myomectomy.  +AP course Engel cerclage placed;10/20/20, otherwise unremarkable.   Denies SOB,fever, chills or cough.  Denies exposure to COVID-19, negative COVID-19 test(21)  Denies VB,ROM or UCs today.  +FM

## 2021-03-15 NOTE — DISCHARGE NOTE OB - HOSPITAL COURSE
Admitted for scheduled C/S and cerclage removal, h/o previous myomectomy and cervical incompetence.  LTCS 3/15/2021, viable male infant.

## 2021-03-15 NOTE — OB PROVIDER DELIVERY SUMMARY - NSPROVIDERDELIVERYNOTE_OBGYN_ALL_OB_FT
Viable male infant, apgar 8/8, wgt 6.15#, 3160gms  delivered @08:56  cephalic presentation, clear copious fluid  hysterotomy closed in single layer /2 layers  bowel adhesion lysed w/metzenbaum scissors and tied, anterior surface of uterus  Elyssa, Fibrillar placed over hysterotomy, rectus layer  2-small pedunculated fibroid slightly anterior to hysterotomy  otherwise grossly nl uterus, tubes & ovaries    QBL: 874  UOP: 150  IVF: 4200  Dictation Number: Viable male infant, apgar 8/8, wgt 6.15#, 3160gms  delivered @08:56  cephalic presentation, clear copious fluid  hysterotomy closed in single layer    anterior surface of uterus noted bowel adhesion cut w/metzenbaum scissors and tied,  Elyssa, Fibrillar placed over hysterotomy, rectus layer  2-small, < 5cm in size. pedunculated fibroid slightly superior to hysterotomy  otherwise grossly nl uterus, tubes & ovaries  Pt was placed in lithotomy position ,   2- Mersaline suture removed from cervix- Engel cerclage    QBL: 874  UOP: 150  IVF: 4200  Dictation Number: [id number] Viable male infant, apgar 8/8, wgt 6.15#, 3160gms  delivered @08:56  cephalic presentation, clear copious fluid  hysterotomy closed in single layer    anterior surface of uterus noted bowel adhesion cut w/metzenbaum scissors and tied,  Elyssa, Fibrillar placed over hysterotomy, rectus layer  2-small, < 5cm in size. pedunculated fibroid slightly superior to hysterotomy  otherwise grossly nl uterus, tubes & ovaries  Pt was placed in lithotomy position ,   2- Prolene suture removed from cervix- Engel cerclage    QBL: 874  UOP: 150  IVF: 4200  Dictation Number: [id number]

## 2021-03-15 NOTE — OB RN PATIENT PROFILE - NS_NUMBOFVISITS_OBGYN_ALL_OB_NU
There are no exam notes on file for this visit.  Chief Complaint   Patient presents with     RECHECK     Follow up from [facility] for headaches.      Blood pressure 129/87, pulse 80, temperature 97.9  F (36.6  C), temperature source Oral, resp. rate 16, weight 94.1 kg (207 lb 6.4 oz), SpO2 98 %, not currently breastfeeding.                 DONALDO Rosa is a 46 year old  female with a PMH significant for:     Patient Active Problem List   Diagnosis     Health Care Home     Herpes simplex virus (HSV) infection     Illiteracy and low-level literacy     Abnormal Pap smear of cervix     Chlamydia     Lateral epicondylitis of right elbow     Carpal tunnel syndrome of right wrist     Enlarged uterus-Normal US 4/2018     She presents with headache follow up.    Started having facial pain on 1/18/19 on left face.  It is a sharp and shooting pain in the left cheek and forehead area that wraps around the face and radiates to the back of the head.  This last for about 2-5 minutes and then she develops a headache on the left side of her head.  The headache lasts for an hour.  Has them about twice a week.  Taking tylenol helps, taking two pills at a time.  Left eye feels and appears droopy when headache happened. No nausea or vomiting, no vision changes. Has had some stuffy nose.  No fevers. No weakness numbness or tingling.  Last episode was last night. Reviewed ER records in detail.    Stopped smoking marijuana on 1/16/19,  Smoking previously daily and constantly, too many times a day to count.  Wondering if used for stress relief.  Her mom and daughter did not like her smoking, they thought that she was addicted.  No clear harms from it that she sees.  She and her son are wondering if she is having withdrawal headaches from stopping marijuana.  She continues to have some anxiety and stress symptoms and is interested in having a new plan for dealing with these.  No mental health diagnoses in the past.    PMH,  Medications and Allergies were reviewed and updated as needed.                Physical Exam:     Vitals:    01/24/19 0957   BP: 129/87   Pulse: 80   Resp: 16   Temp: 97.9  F (36.6  C)   TempSrc: Oral   SpO2: 98%   Weight: 94.1 kg (207 lb 6.4 oz)     Body mass index is 33.99 kg/m .    Exam:  Constitutional: healthy, alert and no distress  Neck: Neck supple. No adenopathy. Thyroid symmetric, normal size,  Eyes: PERRLA, EOMI, conjunctiva are clear.  Left eyelid does appear slightly more puffy than the right but no erythema.  ENT: No nasal discharge. TMs clear, Oropharynx clear with no erythema or exudates.  Tenderness to palpation over frontal and maxillary sinus areas.  No TMJ tenderness.  Cardiovascular:RRR. No murmurs, clicks gallops or rub  Respiratory:  normal respiratory rate and rhythm, lungs clear to auscultation. No wheezes or crackles.  Neuro: Cranial nerves II through XII are grossly intact.  Normal sensation in left face, trigeminal nerve distribution.  5 out of 5 strength throughout.  Normal gross sensation throughout.  Normal gait.  Skin: no rashes.  Psychiatric: mentation appears normal and affect normal/bright      Results from ER [facility] 1/18/19: Normal CBC with differential, CRP, and complete metabolic panel.      Assessment and Plan     Beba was seen today for recheck.    Diagnoses and all orders for this visit:    Trigeminal neuralgia: Symptoms most consistent with trigeminal neuralgia.  Unclear what is causing this.  Will do MRI to rule out structural component and send to ophthalmology to rule out any ocular pathology that may be involved.  Ophthalmology recommended mended before starting carbamazepine which is the first-line medication for this diagnosis as well.  Reviewed ER labs which were all normal and should be sufficient for baseline labs for starting Carbamazepine.  Patient wishes to wait to start this medication until MRI results come back.  Plan short-term follow-up in the next  couple weeks to review results and start carbamazepine.  -     MR Brain w/o & w Contrast; Future  -     OPHTHALMOLOGY ADULT REFERRAL; Future    Cannabis use disorder, mild, in early remission: Just stopped using after using heavy daily use.  No trouble avoiding at this point.    Anxiety and stress: was using cannabis to cope with this. Needs further mental health screening and a new plan for dealing with these sxs at next visit.         Patient Instructions   MR Brain w/o & w Contrast  [facility] Radiology  [phone]  Fax Orders to [phone]    [address] [address]    Appointment:  2019  Arrival Time:  6:45 am    Please bring a copy of your insurance card and photo ID    If you cannot make this appointment please call [phone] to reschedule    2019 at 10:48 Order faxed to [facility] Scheduling at [phone]. [facility]    OPHTHALMOLOGY ADULT REFERRAL  [facility]           [address] [place], [place] [id number]    Appointment:  2019  Arrival Time:  10:15 am  Provider:  Dr. Blankenship      Please bring a copy of your insurance card and photo ID    Your appointment will be between 90 minutes to 2 hours long    Your eyes will be dilated     If you wear contacts or glasses please bring these with you to your appointment     If you cannot make this appointment, please contact [phone] to reschedule    2019 at 10:56 am printed out appointment details given to patient - verified no additional questions or concerns at this time. [facility]       Options for treatment and/or follow-up care were reviewed with the patient. Beba Rosa was engaged and actively involved in the decision making process. She verbalized understanding of the options discussed and was satisfied with the final plan.    Kristy Merino MD   12

## 2021-03-16 LAB
BASOPHILS # BLD AUTO: 0 K/UL — SIGNIFICANT CHANGE UP (ref 0–0.2)
BASOPHILS NFR BLD AUTO: 0 % — SIGNIFICANT CHANGE UP (ref 0–2)
EOSINOPHIL # BLD AUTO: 0.22 K/UL — SIGNIFICANT CHANGE UP (ref 0–0.5)
EOSINOPHIL NFR BLD AUTO: 2.7 % — SIGNIFICANT CHANGE UP (ref 0–6)
HCT VFR BLD CALC: 29.1 % — LOW (ref 34.5–45)
HGB BLD-MCNC: 8.8 G/DL — LOW (ref 11.5–15.5)
IANC: 6.24 K/UL — SIGNIFICANT CHANGE UP (ref 1.5–8.5)
LYMPHOCYTES # BLD AUTO: 0.42 K/UL — LOW (ref 1–3.3)
LYMPHOCYTES # BLD AUTO: 5.3 % — LOW (ref 13–44)
MCHC RBC-ENTMCNC: 24.1 PG — LOW (ref 27–34)
MCHC RBC-ENTMCNC: 30.2 GM/DL — LOW (ref 32–36)
MCV RBC AUTO: 79.7 FL — LOW (ref 80–100)
MONOCYTES # BLD AUTO: 0.93 K/UL — HIGH (ref 0–0.9)
MONOCYTES NFR BLD AUTO: 11.6 % — SIGNIFICANT CHANGE UP (ref 2–14)
NEUTROPHILS # BLD AUTO: 6.14 K/UL — SIGNIFICANT CHANGE UP (ref 1.8–7.4)
NEUTROPHILS NFR BLD AUTO: 75.9 % — SIGNIFICANT CHANGE UP (ref 43–77)
NEUTS BAND # BLD: 0.9 % — SIGNIFICANT CHANGE UP (ref 0–6)
PLAT MORPH BLD: NORMAL — SIGNIFICANT CHANGE UP
PLATELET # BLD AUTO: 206 K/UL — SIGNIFICANT CHANGE UP (ref 150–400)
RBC # BLD: 3.65 M/UL — LOW (ref 3.8–5.2)
RBC # FLD: 17.4 % — HIGH (ref 10.3–14.5)
RBC BLD AUTO: ABNORMAL
VARIANT LYMPHS # BLD: 3.6 % — SIGNIFICANT CHANGE UP (ref 0–6)
WBC # BLD: 7.99 K/UL — SIGNIFICANT CHANGE UP (ref 3.8–10.5)
WBC # FLD AUTO: 7.99 K/UL — SIGNIFICANT CHANGE UP (ref 3.8–10.5)

## 2021-03-16 RX ORDER — IBUPROFEN 200 MG
600 TABLET ORAL EVERY 6 HOURS
Refills: 0 | Status: DISCONTINUED | OUTPATIENT
Start: 2021-03-16 | End: 2021-03-17

## 2021-03-16 RX ORDER — OXYCODONE HYDROCHLORIDE 5 MG/1
5 TABLET ORAL
Refills: 0 | Status: DISCONTINUED | OUTPATIENT
Start: 2021-03-16 | End: 2021-03-17

## 2021-03-16 RX ADMIN — MAGNESIUM HYDROXIDE 30 MILLILITER(S): 400 TABLET, CHEWABLE ORAL at 11:15

## 2021-03-16 RX ADMIN — Medication 975 MILLIGRAM(S): at 09:52

## 2021-03-16 RX ADMIN — SIMETHICONE 80 MILLIGRAM(S): 80 TABLET, CHEWABLE ORAL at 11:15

## 2021-03-16 RX ADMIN — HEPARIN SODIUM 10000 UNIT(S): 5000 INJECTION INTRAVENOUS; SUBCUTANEOUS at 05:42

## 2021-03-16 RX ADMIN — Medication 600 MILLIGRAM(S): at 17:26

## 2021-03-16 RX ADMIN — Medication 975 MILLIGRAM(S): at 16:00

## 2021-03-16 RX ADMIN — Medication 600 MILLIGRAM(S): at 11:16

## 2021-03-16 RX ADMIN — Medication 30 MILLIGRAM(S): at 06:12

## 2021-03-16 RX ADMIN — Medication 1 TABLET(S): at 11:16

## 2021-03-16 RX ADMIN — Medication 600 MILLIGRAM(S): at 12:00

## 2021-03-16 RX ADMIN — Medication 975 MILLIGRAM(S): at 22:15

## 2021-03-16 RX ADMIN — HEPARIN SODIUM 10000 UNIT(S): 5000 INJECTION INTRAVENOUS; SUBCUTANEOUS at 17:25

## 2021-03-16 RX ADMIN — Medication 325 MILLIGRAM(S): at 11:16

## 2021-03-16 RX ADMIN — Medication 30 MILLIGRAM(S): at 05:42

## 2021-03-16 RX ADMIN — Medication 600 MILLIGRAM(S): at 18:00

## 2021-03-16 RX ADMIN — Medication 30 MILLIGRAM(S): at 00:16

## 2021-03-16 RX ADMIN — Medication 975 MILLIGRAM(S): at 21:05

## 2021-03-16 RX ADMIN — Medication 975 MILLIGRAM(S): at 09:07

## 2021-03-16 RX ADMIN — Medication 30 MILLIGRAM(S): at 00:46

## 2021-03-16 RX ADMIN — Medication 975 MILLIGRAM(S): at 15:12

## 2021-03-16 NOTE — LACTATION INITIAL EVALUATION - INTERVENTION OUTCOME
nbn demonstrated  deep latch and  performed  with sucking and swallowing  noted .  discussed  if  nbn  is  not  making  progress at feedings   may  add  expression  of  milk  with  double  pumping .  rn aware  .  nbn  just had  circ  and  may  be  sleepy. mother  states nbn  fed  well  before  circ   20-30 minutes .  continue  to assess./verbalizes understanding

## 2021-03-16 NOTE — LACTATION INITIAL EVALUATION - LACTATION INTERVENTIONS
reviewed  laye   behavior  and  strategies to wake  nbn .   reviewed with  mother breastfeeding section  of  postpartum  book./initiate skin to skin/initiate/review early breastfeeding management guidelines/initiate/review techniques for position and latch/review techniques to increase milk supply/initiate/review breast massage/compression reviewed  late   behavior  and  strategies to wake  nbn .   reviewed with  mother breastfeeding section  of  postpartum  book./initiate skin to skin/initiate/review early breastfeeding management guidelines/initiate/review techniques for position and latch/review techniques to increase milk supply/initiate/review breast massage/compression

## 2021-03-16 NOTE — PROGRESS NOTE ADULT - ASSESSMENT
A/P: 36yo POD#1 s/p LTCS.  Patient is stable and doing well post-operatively.    - Continue regular diet  - Increase ambulation  - Continue motrin, tylenol, oxycodone PRN for pain control.    - f/u AM CBC    Eleazar Tena, PGY-1 A/P: 36yo POD#1 s/p LTCS.  Patient is stable and doing well post-operatively. Hematocrit stable.  - Continue regular diet  - Increase ambulation  - Continue motrin, tylenol, oxycodone PRN for pain control.        Eleazar Tena, PGY-1

## 2021-03-17 VITALS
SYSTOLIC BLOOD PRESSURE: 136 MMHG | HEART RATE: 90 BPM | DIASTOLIC BLOOD PRESSURE: 63 MMHG | TEMPERATURE: 97 F | OXYGEN SATURATION: 99 % | RESPIRATION RATE: 18 BRPM

## 2021-03-17 RX ADMIN — Medication 975 MILLIGRAM(S): at 02:42

## 2021-03-17 RX ADMIN — Medication 600 MILLIGRAM(S): at 00:55

## 2021-03-17 RX ADMIN — Medication 1 TABLET(S): at 11:41

## 2021-03-17 RX ADMIN — Medication 600 MILLIGRAM(S): at 11:41

## 2021-03-17 RX ADMIN — Medication 600 MILLIGRAM(S): at 12:40

## 2021-03-17 RX ADMIN — Medication 600 MILLIGRAM(S): at 06:43

## 2021-03-17 RX ADMIN — Medication 975 MILLIGRAM(S): at 03:45

## 2021-03-17 RX ADMIN — Medication 975 MILLIGRAM(S): at 09:40

## 2021-03-17 RX ADMIN — HEPARIN SODIUM 10000 UNIT(S): 5000 INJECTION INTRAVENOUS; SUBCUTANEOUS at 06:05

## 2021-03-17 RX ADMIN — Medication 975 MILLIGRAM(S): at 08:44

## 2021-03-17 RX ADMIN — Medication 325 MILLIGRAM(S): at 11:41

## 2021-03-17 RX ADMIN — Medication 600 MILLIGRAM(S): at 01:48

## 2021-03-17 RX ADMIN — Medication 600 MILLIGRAM(S): at 06:05

## 2021-03-17 NOTE — PROGRESS NOTE ADULT - ATTENDING COMMENTS
Patient seen and agree with above   Occasional labile BP, asymptomatic  stable for discharge  ARIA Eddy

## 2021-03-17 NOTE — PROGRESS NOTE ADULT - SUBJECTIVE AND OBJECTIVE BOX
OB Progress Note:  Delivery, POD#1    S: 34yo POD#1 s/p LTCS . Pain well controlled, tolerating regular diet, not yet passing flatus, ambulating without difficulty, voiding spontaneously. Denies heavy vaginal bleeding, N/V, CP/SOB/lightheadedness/dizziness.     O:   Vital Signs Last 24 Hrs  T(C): 36.7 (16 Mar 2021 05:38), Max: 37.1 (15 Mar 2021 18:55)  T(F): 98 (16 Mar 2021 05:38), Max: 98.8 (15 Mar 2021 18:55)  HR: 97 (16 Mar 2021 05:38) (64 - 98)  BP: 122/60 (16 Mar 2021 05:38) (92/56 - 140/89)  BP(mean): 70 (16 Mar 2021 05:38) (62 - 110)  RR: 19 (16 Mar 2021 05:38) (18 - 24)  SpO2: 98% (16 Mar 2021 05:38) (97% - 99%)    Labs:  Blood type: A Positive  Rubella IgG: RPR: Negative                          8.8<L>   7.99 >-----------< 206    (  @ 06:17 )             29.1<L>                        9.3<L>   8.18 >-----------< 211    ( 03-15 @ 11:59 )             30.5<L>                        9.9<L>   6.70 >-----------< 220    ( 03-15 @ 06:40 )             33.1<L>    03-15-21 @ 06:40      136  |  103  |  5<L>  ----------------------------<  80  3.6   |  21<L>  |  0.49<L>        Ca    9.3      15 Mar 2021 06:40    TPro  6.5  /  Alb  3.5  /  TBili  0.2  /  DBili  x   /  AST  11  /  ALT  9   /  AlkPhos  111  03-15-21 @ 06:40          PE:  General: NAD  Abdomen: Mildly distended, appropriately tender, Fundus firm, incision c/d/i.  VE: No heavy vaginal bleeding  Extremities: No erythema, no pitting edema    
ANESTHESIA POSTOP CHECK    35y Female POSTOP DAY 1 S/P     [ X] NO APPARENT ANESTHESIA COMPLICATIONS      Comments: 
Postop Day  __1_ s/p   C- Section    THERAPY:  [X] Spinal morphine   [  ] Epidural morphine   [  ] IV PCA Hydromorphone 1 mg/ml      Sedation Score:	  [ X ] Alert	    [  ] Drowsy        [  ] Arousable	[  ] Asleep	[  ] Unresponsive    Side Effects:	  [ X ] None	     [  ] Nausea        [  ] Pruritus        [  ] Weakness   [  ] Numbness        ASSESSMENT/ PLAN   [ X ] Discontinue         [  ] Continue  [ x ]Documentation and Verification of current medications     Comments: May use oral opioids or adjuvant medication PRN for pain at this point.
SUBJECTIVE:    Pain: Controlled    Complaints: None    MILESTONES:    Alert and Oriented x 3  [ x ]  Out of bed/ ambulating. [ x ]  Flatus:   Positive [ x ]  Negative [  ]  Bowel movement  [  ] Positive [  ] Negative   Voiding [x  ] Due to void [  ]   Mcneill/Indwelling catheter in place [  ]  Diet: Regular [ x ]  Clears [  ]  NPO [  ]    Infant feeding:  Breast [  ]   Bottle [  ]  Both [ X ]  Feeding related issues and/or concerns:      OBJECTIVE:  T(C): 36.6 (21 @ 05:27), Max: 37.2 (21 @ 13:55)  HR: 93 (21 @ 05:27) (88 - 93)  BP: 125/66 (21 @ 05:27) (125/66 - 147/71)  RR: 18 (21 @ 05:27) (18 - 18)  SpO2: 100% (21 @ 05:27) (99% - 100%)  Wt(kg): --                        8.8    7.99  )-----------( 206      ( 16 Mar 2021 06:17 )             29.1           Blood Type: A Positive    RPR: Negative          MEDICATIONS  (STANDING):  acetaminophen   Tablet .. 975 milliGRAM(s) Oral <User Schedule>  diphtheria/tetanus/pertussis (acellular) Vaccine (ADAcel) 0.5 milliLiter(s) IntraMuscular once  ferrous    sulfate 325 milliGRAM(s) Oral daily  heparin   Injectable 91379 Unit(s) SubCutaneous every 12 hours  ibuprofen  Tablet. 600 milliGRAM(s) Oral every 6 hours  prenatal multivitamin 1 Tablet(s) Oral daily    MEDICATIONS  (PRN):  diphenhydrAMINE 25 milliGRAM(s) Oral every 6 hours PRN Pruritus  lanolin Ointment 1 Application(s) Topical every 6 hours PRN Sore Nipples  magnesium hydroxide Suspension 30 milliLiter(s) Oral two times a day PRN Constipation  oxyCODONE    IR 5 milliGRAM(s) Oral every 3 hours PRN Moderate to Severe Pain (4-10)  oxyCODONE    IR 5 milliGRAM(s) Oral once PRN Moderate to Severe Pain (4-10)  simethicone 80 milliGRAM(s) Chew every 4 hours PRN Gas        ASSESSMENT:    35y     G 2     P    1011     PO Day#  2        Delivery: Primary [ X ]    Repeat [  ]       QBL - 847                                  Indication of procedure: Scheduled, Hx Previous Myomectomy    Condition: Stable    Past Medical History significant for: HPI:  34yo female   EDC 21  presents@37.1 wks for scheduled pltcs due to h/o myomectomy.  +AP course Engel cerclage placed;10/20/20, otherwise unremarkable.   Denies SOB,fever, chills or cough.  Denies exposure to COVID-19, negative COVID-19 test(21)  Denies VB,ROM or UCs today.  +FM   (15 Mar 2021 05:49)      Current Issues:    Breasts:  Soft [x  ]   Engorged [  ]  Nipples:  Abdomen: Soft [ x ]   Distended [  ] Nontender [  ]     Bowel sounds :  Present [  ]  Absent [  ]   Fundus:  Firm [x  ]  Boggy [  ]    Abdominal incision: Clean, Dry and Intact [x  ]  Staples [  ] Steri Strips [  ] Dermabond [ X ] Sutures [  ]    Patient wearing abdominal binder for support.    Vaginal: Lochia:  Heavy [  ]  Moderate [ x ]   Scant [  ]    Extremities: Edema [  X ] Negative Blanquita's Sign [ X ] Nontender Checo  [ x ] Positive pedal pulses [  ]    Other relevant physical exam findings:      PLAN:    Plan: Increase ambulation, analgesia PRN and pain medication protocol standing oxycodone, ibuprofen and acetaminophen.    Diet: Regular diet    Continue routine post-operative and postpartum care.     Discharge Planning [ x ]    For discharge Today  [  X  ]    Consults:  Social Work [  ]  Lactation [ x ]  Other [         ]   
She is a  35y woman who is now post-operative day 1:     Subjective:  The patient feels well.  She is ambulating.   She is tolerating regular diet.  She denies nausea and vomiting.  She is voiding. +flatus  Her pain is controlled.  She reports normal postpartum bleeding.      Objective:  Vital Signs Last 24 Hrs  T(C): 37.2 (16 Mar 2021 13:55), Max: 37.2 (16 Mar 2021 13:55)  T(F): 99 (16 Mar 2021 13:55), Max: 99 (16 Mar 2021 13:55)  HR: 91 (16 Mar 2021 13:55) (85 - 98)  BP: 135/74 (16 Mar 2021 13:55) (115/67 - 140/65)  BP(mean): 70 (16 Mar 2021 05:38) (70 - 74)  RR: 18 (16 Mar 2021 13:55) (18 - 19)  SpO2: 99% (16 Mar 2021 13:55) (98% - 99%)    Constitutional: NAD  Abdomen: Soft, nontender, no distension, firm uterine fundus  Incision: Clean, dry, and intact  Ext: No calf tenderness bilaterally    LABS:                        8.8    7.99  )-----------( 206      ( 16 Mar 2021 06:17 )             29.1                         9.3    8.18  )-----------( 211      ( 15 Mar 2021 11:59 )             30.5                         9.9    6.70  )-----------( 220      ( 15 Mar 2021 06:40 )             33.1         Allergies    cherries (Swelling; Urticaria)  No Known Drug Allergies    Intolerances      MEDICATIONS  (STANDING):  acetaminophen   Tablet .. 975 milliGRAM(s) Oral <User Schedule>  diphtheria/tetanus/pertussis (acellular) Vaccine (ADAcel) 0.5 milliLiter(s) IntraMuscular once  ferrous    sulfate 325 milliGRAM(s) Oral daily  heparin   Injectable 26661 Unit(s) SubCutaneous every 12 hours  ibuprofen  Tablet. 600 milliGRAM(s) Oral every 6 hours  prenatal multivitamin 1 Tablet(s) Oral daily    MEDICATIONS  (PRN):  diphenhydrAMINE 25 milliGRAM(s) Oral every 6 hours PRN Pruritus  lanolin Ointment 1 Application(s) Topical every 6 hours PRN Sore Nipples  magnesium hydroxide Suspension 30 milliLiter(s) Oral two times a day PRN Constipation  oxyCODONE    IR 5 milliGRAM(s) Oral every 3 hours PRN Moderate to Severe Pain (4-10)  oxyCODONE    IR 5 milliGRAM(s) Oral once PRN Moderate to Severe Pain (4-10)  simethicone 80 milliGRAM(s) Chew every 4 hours PRN Gas        Assessment and Plan  Pt stable POD # 1   s/p  section  -continues postoperative and postpartum care  -encourage ambulation  cont pp care

## 2021-03-18 ENCOUNTER — NON-APPOINTMENT (OUTPATIENT)
Age: 36
End: 2021-03-18

## 2021-03-18 PROBLEM — U07.1 COVID-19: Chronic | Status: ACTIVE | Noted: 2021-03-09

## 2021-03-18 RX ORDER — CHLORHEXIDINE GLUCONATE 4 %
325 (65 FE) LIQUID (ML) TOPICAL DAILY
Refills: 0 | Status: ACTIVE | COMMUNITY
Start: 2021-03-18

## 2021-03-23 ENCOUNTER — NON-APPOINTMENT (OUTPATIENT)
Age: 36
End: 2021-03-23

## 2021-03-29 DIAGNOSIS — Z87.42 PERSONAL HISTORY OF OTHER DISEASES OF THE FEMALE GENITAL TRACT: ICD-10-CM

## 2021-03-29 DIAGNOSIS — O02.1 MISSED ABORTION: ICD-10-CM

## 2021-03-29 DIAGNOSIS — Z82.49 FAMILY HISTORY OF ISCHEMIC HEART DISEASE AND OTHER DISEASES OF THE CIRCULATORY SYSTEM: ICD-10-CM

## 2021-03-29 DIAGNOSIS — E66.9 OBESITY, UNSPECIFIED: ICD-10-CM

## 2021-03-29 DIAGNOSIS — U07.1 COVID-19: ICD-10-CM

## 2021-03-29 DIAGNOSIS — Z82.3 FAMILY HISTORY OF STROKE: ICD-10-CM

## 2021-03-29 DIAGNOSIS — Z83.3 FAMILY HISTORY OF DIABETES MELLITUS: ICD-10-CM

## 2021-03-29 DIAGNOSIS — Z78.9 OTHER SPECIFIED HEALTH STATUS: ICD-10-CM

## 2021-03-29 RX ORDER — IBUPROFEN 600 MG/1
600 TABLET, FILM COATED ORAL EVERY 6 HOURS
Refills: 0 | Status: DISCONTINUED | COMMUNITY
Start: 2021-03-18 | End: 2021-03-29

## 2021-03-29 RX ORDER — CHROMIUM 200 MCG
1000 TABLET ORAL DAILY
Refills: 0 | Status: ACTIVE | COMMUNITY

## 2021-03-29 RX ORDER — ACETAMINOPHEN 500 MG/1
500 TABLET ORAL
Refills: 0 | Status: DISCONTINUED | COMMUNITY
Start: 2021-03-18 | End: 2021-03-29

## 2021-03-30 ENCOUNTER — NON-APPOINTMENT (OUTPATIENT)
Age: 36
End: 2021-03-30

## 2021-04-06 ENCOUNTER — APPOINTMENT (OUTPATIENT)
Dept: CARDIOLOGY | Facility: CLINIC | Age: 36
End: 2021-04-06
Payer: COMMERCIAL

## 2021-04-06 ENCOUNTER — NON-APPOINTMENT (OUTPATIENT)
Age: 36
End: 2021-04-06

## 2021-04-06 VITALS
OXYGEN SATURATION: 99 % | SYSTOLIC BLOOD PRESSURE: 146 MMHG | HEIGHT: 63 IN | HEART RATE: 86 BPM | DIASTOLIC BLOOD PRESSURE: 94 MMHG | BODY MASS INDEX: 36.49 KG/M2

## 2021-04-06 VITALS
TEMPERATURE: 98.6 F | DIASTOLIC BLOOD PRESSURE: 85 MMHG | HEART RATE: 88 BPM | OXYGEN SATURATION: 100 % | SYSTOLIC BLOOD PRESSURE: 139 MMHG

## 2021-04-06 VITALS — WEIGHT: 206 LBS

## 2021-04-06 PROCEDURE — 99204 OFFICE O/P NEW MOD 45 MIN: CPT

## 2021-04-06 PROCEDURE — 93000 ELECTROCARDIOGRAM COMPLETE: CPT

## 2021-04-06 PROCEDURE — 99072 ADDL SUPL MATRL&STAF TM PHE: CPT

## 2021-04-06 RX ORDER — DOCUSATE SODIUM 100 MG/1
100 CAPSULE ORAL 3 TIMES DAILY
Qty: 90 | Refills: 0 | Status: ACTIVE | COMMUNITY
Start: 2021-04-06

## 2021-04-06 NOTE — HISTORY OF PRESENT ILLNESS
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 Additionally she carries a PMH of cerclage removal on 3/15/21 h/o previous myomectomy and cervical incompetence presents in to establish care in the [facility] program. Denies chest pain, shortness of breath, dizziness, lightheadedness, palpitations or near syncope or syncope, orthopnea, PND or increasing lower extremity edema.

## 2021-04-06 NOTE — DISCUSSION/SUMMARY
[FreeTextEntry1] : Ms. Quick is a 35 yr old Fwith strong family history of HTN ( mom and dad ) and preDM ( dad ) and aunt with CVA and personal history of obesity and pregnancy induced hypertension last pregnancy in  2019 and now diagnosed with preeclampsia post  section. \par 1) Preeclampia - BP Stable \par Encouraged Patient to monitor BP at home and keep a log and report results back to us for evaluation. Based on results, we will adjust medications as necessary. \par Additionally, encouraged heart healthy diet and exercise as tolerated.\par EKG with no acute changes.\par Procardia 30 mg QD \par

## 2021-04-06 NOTE — PHYSICAL EXAM
[General Appearance - Well Developed] : well developed [Normal Appearance] : normal appearance [Well Groomed] : well groomed [General Appearance - Well Nourished] : well nourished [No Deformities] : no deformities [General Appearance - In No Acute Distress] : no acute distress [Normal Conjunctiva] : the conjunctiva exhibited no abnormalities [Eyelids - No Xanthelasma] : the eyelids demonstrated no xanthelasmas [Normal Oral Mucosa] : normal oral mucosa [No Oral Pallor] : no oral pallor [No Oral Cyanosis] : no oral cyanosis [Normal Jugular Venous A Waves Present] : normal jugular venous A waves present [Normal Jugular Venous V Waves Present] : normal jugular venous V waves present [No Jugular Venous Corcoran A Waves] : no jugular venous corcoran A waves [Normal] : normal [Normal Rate] : normal [Rhythm Regular] : regular [Normal S1] : normal S1 [Normal S2] : normal S2 [2+] : left 2+ [Respiration, Rhythm And Depth] : normal respiratory rhythm and effort [Exaggerated Use Of Accessory Muscles For Inspiration] : no accessory muscle use [Auscultation Breath Sounds / Voice Sounds] : lungs were clear to auscultation bilaterally [Abdomen Soft] : soft [Abdomen Tenderness] : non-tender [Abdomen Mass (___ Cm)] : no abdominal mass palpated [Abnormal Walk] : normal gait [Gait - Sufficient For Exercise Testing] : the gait was sufficient for exercise testing [Nail Clubbing] : no clubbing of the fingernails [Cyanosis, Localized] : no localized cyanosis [Petechial Hemorrhages (___cm)] : no petechial hemorrhages [Skin Color & Pigmentation] : normal skin color and pigmentation [] : no rash [No Venous Stasis] : no venous stasis [Skin Lesions] : no skin lesions [No Skin Ulcers] : no skin ulcer [No Xanthoma] : no  xanthoma was observed [Oriented To Time, Place, And Person] : oriented to person, place, and time [Mood] : the mood was normal [Affect] : the affect was normal [No Anxiety] : not feeling anxious

## 2021-04-06 NOTE — REASON FOR VISIT
[Initial Evaluation] : an initial evaluation of [FreeTextEntry2] : pregancy induced hypertension, post partum

## 2021-04-16 ENCOUNTER — NON-APPOINTMENT (OUTPATIENT)
Age: 36
End: 2021-04-16

## 2021-04-21 RX ORDER — NIFEDIPINE 30 MG/1
30 TABLET, EXTENDED RELEASE ORAL
Qty: 90 | Refills: 3 | Status: DISCONTINUED | COMMUNITY
Start: 2021-04-06 | End: 2021-04-21

## 2021-06-07 ENCOUNTER — APPOINTMENT (OUTPATIENT)
Dept: CARDIOLOGY | Facility: CLINIC | Age: 36
End: 2021-06-07
Payer: COMMERCIAL

## 2021-06-07 VITALS
OXYGEN SATURATION: 98 % | SYSTOLIC BLOOD PRESSURE: 145 MMHG | DIASTOLIC BLOOD PRESSURE: 97 MMHG | HEART RATE: 71 BPM | BODY MASS INDEX: 37.03 KG/M2 | WEIGHT: 209 LBS | TEMPERATURE: 97.7 F | HEIGHT: 63 IN

## 2021-06-07 PROCEDURE — 93000 ELECTROCARDIOGRAM COMPLETE: CPT

## 2021-06-07 PROCEDURE — 99213 OFFICE O/P EST LOW 20 MIN: CPT

## 2021-06-07 NOTE — DISCUSSION/SUMMARY
[FreeTextEntry1] : 35 year old woman with post partum PEC here for followup\par # HTN - BP high but did not take med regularly\par Labetalol 200 mg BID\par Continue for now and will increase next visit if remains high\par # FU in 8 weeks\par

## 2021-06-07 NOTE — HISTORY OF PRESENT ILLNESS
[FreeTextEntry1] : Here for followup of post partum PEC; feels well no complaints\par EKG normal\par BP still elevated despite meds\par But admits not taking all of the time\par Misses a few times a week\par # HTN - BP high but did not take med regularly\par Labetalol 200 mg BID\par Continue for now and will increase next visit if remains high

## 2021-06-07 NOTE — REASON FOR VISIT
[Follow-Up - Clinic] : a clinic follow-up of [FreeTextEntry2] : pregancy induced hypertension, post partum

## 2021-08-02 ENCOUNTER — NON-APPOINTMENT (OUTPATIENT)
Age: 36
End: 2021-08-02

## 2021-08-02 ENCOUNTER — APPOINTMENT (OUTPATIENT)
Dept: CARDIOLOGY | Facility: CLINIC | Age: 36
End: 2021-08-02
Payer: COMMERCIAL

## 2021-08-02 VITALS
TEMPERATURE: 96.2 F | WEIGHT: 209 LBS | SYSTOLIC BLOOD PRESSURE: 127 MMHG | HEART RATE: 80 BPM | BODY MASS INDEX: 37.03 KG/M2 | DIASTOLIC BLOOD PRESSURE: 85 MMHG | HEIGHT: 63 IN | OXYGEN SATURATION: 98 % | RESPIRATION RATE: 14 BRPM

## 2021-08-02 PROCEDURE — 93000 ELECTROCARDIOGRAM COMPLETE: CPT

## 2021-08-02 PROCEDURE — 99213 OFFICE O/P EST LOW 20 MIN: CPT

## 2021-08-02 NOTE — DISCUSSION/SUMMARY
[FreeTextEntry1] : 35 year old woman with post partum PEC here for followup\par # HTN - BP well controlled\par Labetalol 200 mg BID\par Continue for now\par Check TTE\par # FU in 8 weeks\par

## 2021-08-02 NOTE — HISTORY OF PRESENT ILLNESS
[FreeTextEntry1] : Here for followup of post partum PEC; feels well no complaints\par EKG ordered due to HTN/EKG reviewed and normal\par BP much better on meds\par # HTN - BP well controlled\par Labetalol 200 mg BID\par Continue for now\par Check TTE

## 2021-10-11 ENCOUNTER — APPOINTMENT (OUTPATIENT)
Dept: CARDIOLOGY | Facility: CLINIC | Age: 36
End: 2021-10-11
Payer: COMMERCIAL

## 2021-10-11 VITALS
OXYGEN SATURATION: 98 % | WEIGHT: 200 LBS | TEMPERATURE: 97.2 F | SYSTOLIC BLOOD PRESSURE: 151 MMHG | DIASTOLIC BLOOD PRESSURE: 90 MMHG | RESPIRATION RATE: 14 BRPM | HEIGHT: 63 IN | BODY MASS INDEX: 35.44 KG/M2 | HEART RATE: 87 BPM

## 2021-10-11 PROCEDURE — 99213 OFFICE O/P EST LOW 20 MIN: CPT

## 2021-10-11 PROCEDURE — 93000 ELECTROCARDIOGRAM COMPLETE: CPT

## 2021-10-11 NOTE — HISTORY OF PRESENT ILLNESS
[FreeTextEntry1] : Here for followup of post partum PEC; feels well no complaints\par EKG ordered due to HTN/EKG reviewed and normal\par \par # HTN - BP elevated but not taking meds\par Will make a concerted effort to take each day\par Labetalol 200 mg BID\par Continue for now\par

## 2021-10-11 NOTE — DISCUSSION/SUMMARY
[FreeTextEntry1] : 35 year old woman with post partum PEC here for followup\par # HTN - BP elevated but not taking meds\par Will make a concerted effort to take each day\par Labetalol 200 mg BID\par Continue present meds with better compliance\par \par # FU in 12 weeks\par

## 2021-10-11 NOTE — REASON FOR VISIT
[Hypertension] : hypertension [Follow-Up - Clinic] : a clinic follow-up of [FreeTextEntry2] : pregancy induced hypertension, post partum

## 2021-12-03 NOTE — OB PROVIDER DELIVERY SUMMARY - NSPOSITIONATDELIA_OBGYN_ALL_OB
Patient's medication was to be sent to a different Pharmacy yesterday due to it being out of stock at their normal Pharmacy.  Pharmacy is temporarily closed and not sure when will be reopening.  They would like medication unsure of what was being sent but sent to [facility] in [place] in hopes it will be in stock there.   Occiput Anterior

## 2021-12-13 ENCOUNTER — APPOINTMENT (OUTPATIENT)
Dept: CARDIOLOGY | Facility: CLINIC | Age: 36
End: 2021-12-13
Payer: COMMERCIAL

## 2021-12-13 VITALS
DIASTOLIC BLOOD PRESSURE: 95 MMHG | BODY MASS INDEX: 38.97 KG/M2 | HEIGHT: 63 IN | SYSTOLIC BLOOD PRESSURE: 169 MMHG | HEART RATE: 94 BPM | OXYGEN SATURATION: 97 %

## 2021-12-13 VITALS — DIASTOLIC BLOOD PRESSURE: 96 MMHG | SYSTOLIC BLOOD PRESSURE: 160 MMHG

## 2021-12-13 VITALS — WEIGHT: 220 LBS | BODY MASS INDEX: 38.97 KG/M2

## 2021-12-13 PROCEDURE — 99214 OFFICE O/P EST MOD 30 MIN: CPT

## 2021-12-13 PROCEDURE — 93000 ELECTROCARDIOGRAM COMPLETE: CPT

## 2021-12-13 NOTE — DISCUSSION/SUMMARY
[FreeTextEntry1] : 35 year old woman with post partum PEC here for followup\par # HTN - BP elevated but not taking meds\par Will make a concerted effort to take each day\par Labetalol 200 mg BID--will increase to 400 mg BID\par Continue present meds with better compliance\par \par # FU in 6-8 weeks\par

## 2021-12-13 NOTE — HISTORY OF PRESENT ILLNESS
[FreeTextEntry1] : Here for followup of post partum PEC; feels well no complaints\par EKG ordered due to HTN/EKG reviewed and normal\par \par # HTN - BP still elevated\par Labetalol 200 mg BID\par Will increase to 400 mg BID\par

## 2021-12-27 NOTE — OB PST NOTE - PROBLEM SELECTOR PROBLEM 1
Patient arrived to ED via POV A&O4/4 for a fever. Patient's mother reports that patient had a fever yesterday morning and then again this morning at 0130 she checked his temperature and it was 103. Vitals obtained. Patient placed in a position of comfort with call light within reach.   
Maternal care due to uterine scar from other previous surgery

## 2022-02-25 ENCOUNTER — APPOINTMENT (OUTPATIENT)
Dept: CARDIOLOGY | Facility: CLINIC | Age: 37
End: 2022-02-25
Payer: COMMERCIAL

## 2022-02-25 ENCOUNTER — NON-APPOINTMENT (OUTPATIENT)
Age: 37
End: 2022-02-25

## 2022-02-25 VITALS
WEIGHT: 220 LBS | TEMPERATURE: 97.4 F | HEART RATE: 80 BPM | HEIGHT: 63 IN | OXYGEN SATURATION: 98 % | RESPIRATION RATE: 14 BRPM | BODY MASS INDEX: 38.98 KG/M2 | SYSTOLIC BLOOD PRESSURE: 139 MMHG | DIASTOLIC BLOOD PRESSURE: 84 MMHG

## 2022-02-25 PROCEDURE — 93000 ELECTROCARDIOGRAM COMPLETE: CPT

## 2022-02-25 PROCEDURE — 99214 OFFICE O/P EST MOD 30 MIN: CPT

## 2022-02-25 RX ORDER — LABETALOL HYDROCHLORIDE 200 MG/1
200 TABLET, FILM COATED ORAL
Qty: 360 | Refills: 2 | Status: DISCONTINUED | COMMUNITY
Start: 2021-04-21 | End: 2022-02-25

## 2022-02-25 NOTE — HISTORY OF PRESENT ILLNESS
[FreeTextEntry1] : Here for followup of post partum PEC; feels well no complaints\par EKG ordered due to HTN/EKG reviewed and evidence of LVH\par \par # HTN - Labetalol increased last visit; BP slightly improved\par Labetalol 400 mg BID--want to change to once daily\par Start Norvasc 5 mg daily\par Check TTE\par \par

## 2022-02-25 NOTE — DISCUSSION/SUMMARY
[FreeTextEntry1] : 35 year old woman with post partum PEC here for followup\par \par # HTN - Labetalol increased last visit; BP slightly improved\par Labetalol 400 mg BID--want to change to once daily\par Start Norvasc 5 mg daily\par Check TTE\par \par # FU in 3 months\par

## 2022-03-29 NOTE — ASU PREOP CHECKLIST - BMI (KG/M2)
POD#6 Awake, alert. No complaints. Denies CP, palpitations, SOB at rest, dizziness/lightheadedness. Vitals:    03/29/22 0600 03/29/22 0813 03/29/22 0840 03/29/22 1025   BP:  129/83  110/71   Pulse:  86  89   Resp:  18  18   Temp:  97.3 °F (36.3 °C)  97.3 °F (36.3 °C)   TempSrc:  Temporal  Temporal   SpO2:  95% 98%    Weight: 189 lb (85.7 kg)      Height:         O2: RA      Intake/Output Summary (Last 24 hours) at 3/29/2022 1144  Last data filed at 3/29/2022 1002  Gross per 24 hour   Intake 300 ml   Output 1800 ml   Net -1500 ml         +BM on 3/28    UO: 900mL/8hr       Recent Labs     03/27/22  0619 03/28/22  0704 03/29/22  0625   WBC 9.1 8.7 7.6   HGB 7.8* 7.9* 7.7*   HCT 25.4* 25.4* 25.2*    213 259      Recent Labs     03/27/22  0619 03/28/22  0704 03/29/22  0625   BUN 17 20 18   CREATININE 0.8 0.9 0.9        Telemetry: SR        PE  Cardiac: RRR  Lungs: decreased bases  Chest incision C/D/I, approximated, no erythema. Sternum stable. Prior chest tube site incisions C/D/I, no erythema with intact sutures. Epicardial pacing wires present and secure.    Abd: Soft, nontender, +BS  Ext: Left groin cannulation site soft and intact             A/P: POD#6     1. severe mitral regurgitation, streptococcus bacteremia, mitral valve endocarditis, atrial fibrillation  --Stable s/p ANITRA, mitral valve repair (P2 triangular resection, P3/P3 perforation repair, 34mm Future band), MAZE procedure, 40mm Atriclip   --Post op ANITRA reveals 30% EF  --will order TTE prior to discharge to establish baseline and need for life vest on DC - ordered on 3/26 - resulted with EF 25-30%--WCD ordered and to be fitted today  --Scr stable 0.9  --ASA/statin   --reinforce sternal precautions       2. Expected acute blood loss anemia secondary to open heart surgery  --stable- hgb 7.7 no active bleeding, cont to monitor   --[facility] 3/24         3. Atrial fibrillation s/p MAZE procedure, 40mm Atriclip   - SR, prophylactic amio started- currently on amio 200mg tid   - Continue low dose BB today  --afib pre-op only, none since surgery  --discussed possible need for [address] on dc with attending, not needed, continue asa         4. Expected acute pulmonary insufficiency in the setting following surgery  --continue duonebs with ezpap  --encourage C&DB, SMI  --currently on RA  -- Lasix daily         5. streptococcus bacteremia; MV Endocarditis  - Antibiotics per ID; Ampicillin 2grams q4 hours          6. Cardiac Insufficiency  - EF 30% post CPB, requiring epinephrine gtt for hemodynamic support  - Off Epi. SBP ~100. SBP improved - low dose coreg started 3/27, tolerating with improved BP  - GDMT as hemodynamics allow - low dose cozaar and spironolactone added today per cardiology         7.  Constipation--expected delayed return of bowel function  --secondary to anesthesia, narcotics, decreased oral intake, and decreased physical activity   --resolved  --Encouraged continued increase in oral intake and activity.          8. Expected deconditioning in the setting following surgery  --Increase activity as tolerated  --PT/OT  --currently ambulating 400ft            DVT prophylaxis:  --continue bilateral knee high GODWIN hose  --continue PCDs  --continue progressive ambulation  --continue knee high GODWIN hose/pcds/progressive ambulation      Dispo: home today AFTER lifevest fitted       This patient's case and care plan was discussed with the attending surgeon 38.3

## 2022-05-09 NOTE — OB RN PATIENT PROFILE - POST PARTUM DEPRESSION SCREEN OB 3
History of Present Illness     Chief Complaint   Patient presents with   â¢ Nose Problem        HPI   David See is a 68year old male with a significant PMH of chronic kidney disease, diabetes, prostate cancer, history of renal cell carcinoma, hypertension, dyslipidemia who presents to the urgent care with epistaxis. Patient reports that for the past month he has had frequent bloody noses. Most recent 1 was earlier this afternoon which lasted approximately 45 minutes and resolved prior to arrival.  Denies aggravating or relieving factors. Patient applied direct pressure utilize Kleenex. He reports that he has had to get his nose cauterized in the past.  Denies fevers, cold symptoms, nose trauma. Patient is on aspirin but denies any other blood thinners.           Past Medical History:   Diagnosis Date   â¢ Adrenal mass (CMS/[facility])    â¢ Basal cell carcinoma of back 02/22/2014   â¢ CKD (chronic kidney disease) stage 3, GFR 30-59 ml/min (CMS/[facility]) 02/17/2015    baseline creatinine 1.8, only has right kidney, proteinuria   â¢ Claustrophobia    â¢ Diabetes mellitus (CMS/[facility])    â¢ Elevated prostate specific antigen (PSA) 09/09/2019    6.38   â¢ First degree AV block    â¢ Gout 05/16/2016   â¢ HLD (hyperlipidemia)    â¢ HTN (hypertension)    â¢ Hypertrophied anal papilla 03/15/2010   â¢ Hypoglycemia 7/2/2020   â¢ Ingrown hair 11/29/2018   â¢ Inguinal and left flank hernia 06/13/2017   â¢ Leg swelling 6/12/2020   â¢ Low iron 6/11/2018   â¢ Macular degeneration    â¢ Pancreatic mass 5/4/2020    S/p resection   â¢ Proteinuria 2/4/2007   â¢ Renal cell carcinoma (CMS/[facility]) 04/2004    s/p left nephrectomy   â¢ Retinal hemorrhage 2013   â¢ Rotator cuff syndrome of left shoulder    â¢ Sigmoid diverticulosis 03/06/2017   â¢ Tremors of nervous system         Past Surgical History:   Procedure Laterality Date   â¢ Biopsy of prostate,needle/punch Bilateral 02/21/2020    EPSA 6.38   â¢ Cataract extraction w/ intraocular lens implant Right 2013   â¢ Colonoscopy  03/15/2010    Dr. Munira Méndez, repeat    â¢ Colonoscopy  2017    Dr. Jose Flores, repeat in 5 years   â¢ Cystourethroscopy  2021    bladder wall thickening, Dr. Ronaldo Robbins   â¢ Esophagogastroduodenoscopy w/endoscopic us eso stomach duod  2020    pancreatic FNA>Malignant cells most consistent with metastatic clear-cell renal cell carcinoma, [id number]   â¢ Eye surgery Left , 2016 - 2017    injections    â¢ Inguinal hernia repair Bilateral 2017    large left ventral flank hernia repair with 2 inguinal hernia repairs-Dr Brittany Hopson   â¢ Inguinal hernia repair Right    â¢ Knee scope,diagnostic Right     for septic arthritis   â¢ Lap partial nephrectomy Right 2012    Simple cyst, Dr. Ronaldo Robbins ([place])   â¢ Nephrectomy Left    â¢ Part remv pancreas,distal subtotal  2020    [place]; robotic distal pancreatectomy with splenectomy   â¢ Tonsillectomy and adenoidectomy      as a child        Family History   Problem Relation Age of Onset   â¢ Stroke Mother          of stroke age 47/smoker   â¢ Heart Mother         MI   â¢ Infectious Disease Father          of pneumonia age 78   â¢ Cancer Brother         prostate cancer   â¢ Other Sister         sister is healthy   â¢ Other Brother         DJD   â¢ High blood pressure Maternal Grandmother    â¢ Diabetes Maternal Grandmother    â¢ Heart disease Maternal Grandfather    â¢ High blood pressure Maternal Grandfather             Current Outpatient Medications   Medication Sig Dispense Refill   â¢ Lantus SoloStar 100 UNIT/ML pen-injector INJECT 15 UNITS INTO THE SKIN NIGHTLY. 15 mL 0   â¢ allopurinol (ZYLOPRIM) 100 MG tablet Take 1 tablet by mouth 2 times daily. 180 tablet 3   â¢ lisinopril (ZESTRIL) 40 MG tablet Take 1 tablet by mouth daily. 90 tablet 3   â¢ doxazosin (CARDURA) 4 MG tablet Take 1 tablet by mouth nightly. 90 tablet 3   â¢ amLODIPine (NORVASC) 5 MG tablet Take 1 tablet by mouth daily.  90 tablet 3   â¢ furosemide (Lasix) 40 MG tablet Take 1 tablet by mouth daily. 90 tablet 3   â¢ blood glucose test strip 1 application 2 times daily. Diagnosis: Diabetes Mellitus. Meter: One Touch Ultra Blue 100 each 5   â¢ Tradjenta 5 MG tablet TAKE 1 TABLET BY MOUTH DAILY. 90 tablet 1   â¢ lovastatin (MEVACOR) 40 MG tablet Take 1 tablet by mouth daily. 90 tablet 3   â¢ Insulin Pen Needle 31G X 5 MM Misc Use to inject insulin once nightly 100 each 3   â¢ propranolol (INDERAL) 10 MG tablet TAKE ONE TABLET BY MOUTH ONCE DAILY  90 tablet 3   â¢ Cholecalciferol (Vitamin D) 50 mcg (2,000 units) tablet Take 50 mcg by mouth daily. â¢ calcium carbonate (TUMS) 500 MG chewable tablet Chew 1 tablet by mouth daily. 180 tablet 3   â¢ SUPER B COMPLEX/C PO Take 1 tablet by mouth daily. â¢ Ferrous Sulfate (IRON) 325 (65 Fe) MG Tab Take 1 tablet by mouth every morning. â¢ Blood Glucose Monitoring Suppl w/Device Kit Whichever is covered by insurance. 1 kit 0   â¢ aspirin (ECOTRIN) 81 MG EC tablet Take 1 tablet by mouth daily. No current facility-administered medications for this visit. ALLERGIES:   Allergen Reactions   â¢ Contrast Media HIVES   â¢ Penicillin V ANAPHYLAXIS     throat swellowing   â¢ Nsaids HIVES     Also CKD        Social History     Tobacco Use   â¢ Smoking status: Never Smoker   â¢ Smokeless tobacco: Never Used   Substance Use Topics   â¢ Alcohol use: Not Currently     Alcohol/week: 0.0 - 1.0 standard drinks     Comment: 1 drink per month   â¢ Drug use: No          Review of Systems  Pertinent ROS discussed in HPI. The remaining systems have been reviewed and are negative. Physical Exam & Diagnostics     Most Recent Vitals:   Vitals:    05/09/22 1616   BP: (!) 148/74   Pulse: 88   Resp: 16   Temp: 97.4 Â°F (36.3 Â°C)   TempSrc: Tympanic   SpO2: 98%         Physical Exam  Constitutional:       General: He is not in acute distress. Appearance: Normal appearance. He is not ill-appearing or toxic-appearing.    HENT: Head: Normocephalic and atraumatic. Jaw: No trismus. Right Ear: Tympanic membrane, ear canal and external ear normal.      Left Ear: Tympanic membrane, ear canal and external ear normal.      Nose: Mucosal edema present. No congestion or rhinorrhea. Comments: Dried blood in the nose bilaterally without active bleeding. Mouth/Throat:      Mouth: Mucous membranes are moist.      Pharynx: Oropharynx is clear. Uvula midline. Neck: Normal range of motion and neck supple. No rigidity. Eyes:      General:         Right eye: No discharge. Left eye: No discharge. Conjunctiva/sclera: Conjunctivae normal.   Cardiovascular:      Rate and Rhythm: Normal rate and regular rhythm. Heart sounds: Normal heart sounds. Pulmonary:      Effort: Pulmonary effort is normal. No respiratory distress. Breath sounds: Normal breath sounds. Lymphadenopathy:      Cervical: No cervical adenopathy. Neurological:      Mental Status: He is alert and oriented to person, place, and time. Psychiatric:         Mood and Affect: Mood normal.         Behavior: Behavior normal.            EKG:       Labs:   No results found for this visit on 05/09/22. Imaging:            Procedures           Medical Decision Making & Urgent Care Course:   Multiple diagnoses considered in the evaluation of this patient. Vital signs are stable. He is nontoxic-appearing and in no signs of acute distress. Most recent CBC from approximately 6 months ago was reassuring. Normal platelets. No active bleeding at this time. Discussed with patient procedure to follow if epistaxis recurs. ENT referral placed given frequent epistaxis over the past month. Patient and wife understood and agreed with plan. 1. History of epistaxis  -     SERVICE TO ENT          Disposition   Patient being discharged. Plan for primary care physician follow up for persistent symptoms.  I do not believe that the patient has an acute emergency medical condition requiring additional emergency management at this time. The patient is currently stable for outpatient treatment and continuation of care. Important signs and symptoms that would prompt evaluation in an emergency department were reviewed. The patient was provided the opportunity to ask questions. All questions were addressed. The patient verbalized understanding and agreed with the discharge assessment and plan. Patient seen in collaboration with my Supervising Physician, Dr. Maritza Cooper. no

## 2022-05-27 ENCOUNTER — APPOINTMENT (OUTPATIENT)
Dept: CARDIOLOGY | Facility: CLINIC | Age: 37
End: 2022-05-27

## 2022-08-22 ENCOUNTER — APPOINTMENT (OUTPATIENT)
Dept: CARDIOLOGY | Facility: CLINIC | Age: 37
End: 2022-08-22

## 2022-08-22 ENCOUNTER — NON-APPOINTMENT (OUTPATIENT)
Age: 37
End: 2022-08-22

## 2022-08-22 VITALS
DIASTOLIC BLOOD PRESSURE: 88 MMHG | HEART RATE: 105 BPM | WEIGHT: 234 LBS | HEIGHT: 63 IN | OXYGEN SATURATION: 97 % | SYSTOLIC BLOOD PRESSURE: 161 MMHG | BODY MASS INDEX: 41.46 KG/M2

## 2022-08-22 VITALS — SYSTOLIC BLOOD PRESSURE: 137 MMHG | DIASTOLIC BLOOD PRESSURE: 89 MMHG

## 2022-08-22 DIAGNOSIS — I10 ESSENTIAL (PRIMARY) HYPERTENSION: ICD-10-CM

## 2022-08-22 PROCEDURE — 93000 ELECTROCARDIOGRAM COMPLETE: CPT

## 2022-08-22 PROCEDURE — 99214 OFFICE O/P EST MOD 30 MIN: CPT | Mod: 25

## 2022-08-22 NOTE — HISTORY OF PRESENT ILLNESS
[FreeTextEntry1] : Here for followup of post partum PEC; feels well no complaints\par EKG ordered due to HTN/EKG reviewed and evidence of LVH\par \par # HTN - Labetalol changed to Norvasc last visit\par Norvasc 5 mg daily\par \par \par

## 2022-08-22 NOTE — DISCUSSION/SUMMARY
[FreeTextEntry1] : 35 year old woman with post partum PEC here for followup\par \par # HTN - Labetalol increased last visit; BP slightly improved\par Norvasc 5 mg daily\par \par # FU in 3 months\par  [EKG obtained to assist in diagnosis and management of assessed problem(s)] : EKG obtained to assist in diagnosis and management of assessed problem(s)

## 2022-08-25 NOTE — ASU PATIENT PROFILE, ADULT - CENTRAL VENOUS CATHETER
Dr Bharti Fraser at bedside, discussed plan of care to proceed with D&C. Pt and spouse verbalize understanding, questions answered. no

## 2022-11-15 NOTE — DISCUSSION/SUMMARY
[FreeTextEntry1] : 35 year old woman with post partum PEC here for followup\par \par # HTN - Labetalol increased last visit; BP slightly improved\par Norvasc 5 mg daily\par \par # FU in 3 months\par

## 2022-11-22 ENCOUNTER — APPOINTMENT (OUTPATIENT)
Dept: CARDIOLOGY | Facility: CLINIC | Age: 37
End: 2022-11-22

## 2022-11-22 NOTE — OB RN PREOPERATIVE CHECKLIST - ADVANCE DIRECTIVE ADDRESSED/READDRESSED
----- Message from Jem Zurita sent at 11/22/2022 11:54 AM EST -----  Subject: Refill Request    QUESTIONS  Name of Medication? hydroCHLOROthiazide (HYDRODIURIL) 25 MG tablet  Patient-reported dosage and instructions? Once daily  How many days do you have left? 5  Preferred Pharmacy? [id number]  Pharmacy phone number (if available)? [phone]  Additional Information for Provider? Pt's next appt is on 12/14/22.  ---------------------------------------------------------------------------  --------------  CALL BACK INFO  What is the best way for the office to contact you? OK to leave message on   voicemail  Preferred Call Back Phone Number? [phone]  ---------------------------------------------------------------------------  --------------  SCRIPT ANSWERS  Relationship to Patient?  Self done

## 2023-02-16 NOTE — ED PROVIDER NOTE - CONDUCTED A DETAILED DISCUSSION WITH PATIENT AND/OR GUARDIAN REGARDING, MDM
need for transfer/lab results/need for outpatient follow-up/radiology results
Total Number Of Aks Treated: 2
Duration Of Freeze Thaw-Cycle (Seconds): 5
Number Of Freeze-Thaw Cycles: 1 freeze-thaw cycle
Render In Bullet Format When Appropriate: No
Post-Care Instructions: I reviewed with the patient in detail post-care instructions. Patient is to wear sunprotection, and avoid picking at any of the treated lesions. Pt may apply Vaseline to crusted or scabbing areas.
Detail Level: Zone
Consent: The patient's consent was obtained including but not limited to risks of crusting, scabbing, blistering, scarring, darker or lighter pigmentary change, recurrence, incomplete removal and infection.

## 2023-02-27 RX ORDER — AMLODIPINE BESYLATE 5 MG/1
5 TABLET ORAL DAILY
Qty: 90 | Refills: 3 | Status: ACTIVE | COMMUNITY
Start: 2022-02-25 | End: 1900-01-01

## 2023-05-16 NOTE — DISCHARGE NOTE OB - SEVERE ABDOMINAL/VAGINAL AND/OR RECTAL PAIN
Writer left a detailed message to call us back referred by the [facility] for a \" Nevus\"  Referred by Paco LEE NP. Referral  #[id number].    Amaya WOODS DERM       Statement Selected

## 2023-10-12 NOTE — H&P PST ADULT - BMI (KG/M2)
Problem: Safety  Goal: Patient will be injury free during hospitalization  10/12/2023 0504 by May Singleton RN  Outcome: Progressing  10/11/2023 2350 by May Singleton RN  Outcome: Progressing  10/11/2023 2245 by May Singleton RN  Outcome: Progressing  Goal: I will remain free of falls  10/12/2023 0504 by May Singleton RN  Outcome: Progressing  10/11/2023 2350 by May Singleton RN  Outcome: Progressing  10/11/2023 2245 by May Singleton RN  Outcome: Progressing     Problem: Daily Care  Goal: Daily care needs are met  Outcome: Progressing     Problem: Psychosocial Needs  Goal: Demonstrates ability to cope with hospitalization/illness  Outcome: Progressing     Problem: Fall/Injury  Goal: Not fall by end of shift  10/12/2023 0504 by May Singleton RN  Outcome: Progressing  10/11/2023 2245 by May Singleton RN  Outcome: Progressing  Goal: Be free from injury by end of the shift  10/12/2023 0504 by May Singleton RN  Outcome: Progressing  10/11/2023 2245 by May Singleton RN  Outcome: Progressing  Goal: Verbalize understanding of personal risk factors for fall in the hospital  10/12/2023 0504 by May Singleton RN  Outcome: Progressing  10/11/2023 2245 by May Singleton RN  Outcome: Progressing     Problem: Skin  Goal: Participates in plan/prevention/treatment measures  Outcome: Progressing  Goal: Prevent/manage excess moisture  10/12/2023 0504 by May Singleton RN  Outcome: Progressing  10/11/2023 2245 by May Singleton RN  Outcome: Progressing  Goal: Prevent/minimize sheer/friction injuries  Outcome: Progressing     Problem: Heart Failure  Goal: Improved urinary output this shift  Outcome: Progressing  Goal: Reduction in peripheral edema within 24 hours  Outcome: Progressing  Goal: Report improvement of dyspnea/breathlessness this shift  Outcome: Progressing     Problem: Pain - Adult  Goal: Verbalizes/displays adequate comfort level or baseline comfort level  Outcome: Progressing   The  patient's goals for the shift include decreased DEJESUS    The clinical goals for the shift include to remain safe/injury-free     38.3

## 2024-01-01 NOTE — ASU PATIENT PROFILE, ADULT - PAIN LOCATION
Breasts of normal contour, size, color and symmetry, without milk, signs of inflammation or tenderness; nipples with normal size, shape, number and spacing.  Axillary exam normal. acceptable comfort level=7

## 2024-02-07 NOTE — OB RN PATIENT PROFILE - SURGICAL SITE INCISION
Left a message for pt regarding NIPS results reviewed by Dr. Mayo. Gender NOT provided. Reviewed OB will order MSAFP and timing of test. Message routed to OB to order MSAFP.    no

## 2024-03-31 NOTE — OB PROVIDER H&P - BLOOD TRANSFUSION, PREVIOUS, PROFILE
2016/yes You can access the FollowMyHealth Patient Portal offered by [facility] by registering at the following website: [email or web address]. By joining [facility]’s FollowMyHealth portal, you will also be able to view your health information using other applications (apps) compatible with our system.

## 2024-08-08 NOTE — ED PROVIDER NOTE - GASTROINTESTINAL NEGATIVE STATEMENT, MLM
Detail Level: Detailed Size Of Lesion In Cm (Optional): 0 no abdominal pain, no bloating, no constipation, no diarrhea, no nausea and no vomiting.

## 2024-09-24 NOTE — ASU PREOP CHECKLIST - WARM FLUIDS/WARM BLANKETS
C3 nurse attempted to contact Karine Nayak  for a TCC post hospital discharge follow up call. No answer. Left voicemail with callback information. The patient does not have a scheduled HOSFU appointment. Message sent to PCP staff for assistance with scheduling visit with patient.       yes

## 2024-09-28 NOTE — ASU PATIENT PROFILE, ADULT - PRO MENTAL HEALTH SX RECENT
Nasal precautions  - DO NOT blow your nose for 2 weeks. The only exception is that you may lightly blow your nose after using a sinus rinse.  - Sneeze/cough with mouth open  - Avoid irritating substances that might make you sneeze, such as dust, chalk, harsh chemicals, and allergic triggers. This might also include spicy foods.  - Do not smoke   - Avoid flying or swimming for 2 weeks.    - Avoid all heavy lifting, straining or bending for 2 weeks.   - Avoid semi-contact sports or vigorous exercising for 3-4 weeks.       Aftercare/ moisturizing recommendations to decrease frequency of nosebleeds:  - Daily nasal saline sprays/rinses  - Nightly application of vaseline/aquaphor to anterior nares  - Room humidification  - Avoidance of nasal cannula if possible (always with humidification and please use face tent, nasal cup, facemask if possible)  Management of medical conditions contributing to epistaxis (coagulopathy, hypertension, etc.)  - Humidification of CPAP appliance if applicable     If rebleeding occurs:   - Apply Afrin liberally to both nostrils  - Apply pressure over the soft part of the nose for 15 minutes (clip or digital pressure, important to have pressure over soft part of nose and consistent pressure (do not release pressure at any point))  - Instruct patient to lean forward, avoid swallowing blood. This can cause nausea and vomiting  - Can repeat these steps above up to 3 times  - Call/reconsult ENT or return to the nearest emergency department if this is unsuccessful   none

## 2024-12-10 NOTE — OB PROVIDER H&P - NSFIRSTDATEVISIT_OBGYN_ALL_OB
Unknown [Alert] : alert [No Acute Distress] : no acute distress [Playful] : playful [Normocephalic] : normocephalic [Conjunctivae with no discharge] : conjunctivae with no discharge [PERRL] : PERRL [EOMI Bilateral] : EOMI bilateral [Auricles Well Formed] : auricles well formed [Clear Tympanic membranes with present light reflex and bony landmarks] : clear tympanic membranes with present light reflex and bony landmarks [No Discharge] : no discharge [Nares Patent] : nares patent [Pink Nasal Mucosa] : pink nasal mucosa [Palate Intact] : palate intact [Uvula Midline] : uvula midline [Nonerythematous Oropharynx] : nonerythematous oropharynx [No Caries] : no caries [Trachea Midline] : trachea midline [Supple, full passive range of motion] : supple, full passive range of motion [No Palpable Masses] : no palpable masses [Symmetric Chest Rise] : symmetric chest rise [Clear to Auscultation Bilaterally] : clear to auscultation bilaterally [Normoactive Precordium] : normoactive precordium [Regular Rate and Rhythm] : regular rate and rhythm [Normal S1, S2 present] : normal S1, S2 present [No Murmurs] : no murmurs [+2 Femoral Pulses] : +2 femoral pulses [Soft] : soft [NonTender] : non tender [Non Distended] : non distended [Normoactive Bowel Sounds] : normoactive bowel sounds [No Hepatomegaly] : no hepatomegaly [No Splenomegaly] : no splenomegaly [Alfredo 1] : Alfredo 1 [No Clitoromegaly] : no clitoromegaly [Normal Vagina Introitus] : normal vagina introitus [Patent] : patent [Normally Placed] : normally placed [No Abnormal Lymph Nodes Palpated] : no abnormal lymph nodes palpated [Symmetric Buttocks Creases] : symmetric buttocks creases [Symmetric Hip Rotation] : symmetric hip rotation [No Gait Asymmetry] : no gait asymmetry [No pain or deformities with palpation of bone, muscles, joints] : no pain or deformities with palpation of bone, muscles, joints [Normal Muscle Tone] : normal muscle tone [No Spinal Dimple] : no spinal dimple [NoTuft of Hair] : no tuft of hair [Straight] : straight [+2 Patella DTR] : +2 patella DTR [Cranial Nerves Grossly Intact] : cranial nerves grossly intact [No Rash or Lesions] : no rash or lesions